# Patient Record
Sex: FEMALE | Race: WHITE | Employment: OTHER | URBAN - NONMETROPOLITAN AREA
[De-identification: names, ages, dates, MRNs, and addresses within clinical notes are randomized per-mention and may not be internally consistent; named-entity substitution may affect disease eponyms.]

---

## 2017-01-25 ENCOUNTER — OFFICE VISIT (OUTPATIENT)
Dept: NEPHROLOGY | Age: 67
End: 2017-01-25

## 2017-01-25 VITALS
DIASTOLIC BLOOD PRESSURE: 80 MMHG | HEART RATE: 60 BPM | RESPIRATION RATE: 18 BRPM | SYSTOLIC BLOOD PRESSURE: 142 MMHG | WEIGHT: 133.4 LBS | BODY MASS INDEX: 20.89 KG/M2

## 2017-01-25 DIAGNOSIS — N18.30 CKD (CHRONIC KIDNEY DISEASE) STAGE 3, GFR 30-59 ML/MIN (HCC): Primary | ICD-10-CM

## 2017-01-25 DIAGNOSIS — I10 ESSENTIAL HYPERTENSION: ICD-10-CM

## 2017-01-25 PROCEDURE — 1036F TOBACCO NON-USER: CPT | Performed by: INTERNAL MEDICINE

## 2017-01-25 PROCEDURE — 1123F ACP DISCUSS/DSCN MKR DOCD: CPT | Performed by: INTERNAL MEDICINE

## 2017-01-25 PROCEDURE — G8400 PT W/DXA NO RESULTS DOC: HCPCS | Performed by: INTERNAL MEDICINE

## 2017-01-25 PROCEDURE — 3017F COLORECTAL CA SCREEN DOC REV: CPT | Performed by: INTERNAL MEDICINE

## 2017-01-25 PROCEDURE — G8419 CALC BMI OUT NRM PARAM NOF/U: HCPCS | Performed by: INTERNAL MEDICINE

## 2017-01-25 PROCEDURE — 3014F SCREEN MAMMO DOC REV: CPT | Performed by: INTERNAL MEDICINE

## 2017-01-25 PROCEDURE — G8484 FLU IMMUNIZE NO ADMIN: HCPCS | Performed by: INTERNAL MEDICINE

## 2017-01-25 PROCEDURE — 1090F PRES/ABSN URINE INCON ASSESS: CPT | Performed by: INTERNAL MEDICINE

## 2017-01-25 PROCEDURE — 4040F PNEUMOC VAC/ADMIN/RCVD: CPT | Performed by: INTERNAL MEDICINE

## 2017-01-25 PROCEDURE — 99204 OFFICE O/P NEW MOD 45 MIN: CPT | Performed by: INTERNAL MEDICINE

## 2017-01-25 PROCEDURE — G8427 DOCREV CUR MEDS BY ELIG CLIN: HCPCS | Performed by: INTERNAL MEDICINE

## 2017-01-25 RX ORDER — ATORVASTATIN CALCIUM 20 MG/1
20 TABLET, FILM COATED ORAL DAILY
COMMUNITY

## 2017-02-22 ENCOUNTER — OFFICE VISIT (OUTPATIENT)
Dept: NEPHROLOGY | Age: 67
End: 2017-02-22

## 2017-02-22 VITALS — WEIGHT: 133 LBS | DIASTOLIC BLOOD PRESSURE: 80 MMHG | SYSTOLIC BLOOD PRESSURE: 124 MMHG | BODY MASS INDEX: 20.83 KG/M2

## 2017-02-22 DIAGNOSIS — N18.2 CKD (CHRONIC KIDNEY DISEASE) STAGE 2, GFR 60-89 ML/MIN: Primary | ICD-10-CM

## 2017-02-22 DIAGNOSIS — I10 ESSENTIAL HYPERTENSION: ICD-10-CM

## 2017-02-22 PROCEDURE — 3014F SCREEN MAMMO DOC REV: CPT | Performed by: INTERNAL MEDICINE

## 2017-02-22 PROCEDURE — G8419 CALC BMI OUT NRM PARAM NOF/U: HCPCS | Performed by: INTERNAL MEDICINE

## 2017-02-22 PROCEDURE — 4040F PNEUMOC VAC/ADMIN/RCVD: CPT | Performed by: INTERNAL MEDICINE

## 2017-02-22 PROCEDURE — 99214 OFFICE O/P EST MOD 30 MIN: CPT | Performed by: INTERNAL MEDICINE

## 2017-02-22 PROCEDURE — G8484 FLU IMMUNIZE NO ADMIN: HCPCS | Performed by: INTERNAL MEDICINE

## 2017-02-22 PROCEDURE — 1123F ACP DISCUSS/DSCN MKR DOCD: CPT | Performed by: INTERNAL MEDICINE

## 2017-02-22 PROCEDURE — 1036F TOBACCO NON-USER: CPT | Performed by: INTERNAL MEDICINE

## 2017-02-22 PROCEDURE — G8427 DOCREV CUR MEDS BY ELIG CLIN: HCPCS | Performed by: INTERNAL MEDICINE

## 2017-02-22 PROCEDURE — G8400 PT W/DXA NO RESULTS DOC: HCPCS | Performed by: INTERNAL MEDICINE

## 2017-02-22 PROCEDURE — 1090F PRES/ABSN URINE INCON ASSESS: CPT | Performed by: INTERNAL MEDICINE

## 2017-02-22 PROCEDURE — 3017F COLORECTAL CA SCREEN DOC REV: CPT | Performed by: INTERNAL MEDICINE

## 2017-08-23 ENCOUNTER — HOSPITAL ENCOUNTER (OUTPATIENT)
Dept: ULTRASOUND IMAGING | Age: 67
Discharge: HOME | End: 2017-08-23
Payer: MEDICARE

## 2017-08-23 DIAGNOSIS — N93.9 VAGINAL BLEEDING: ICD-10-CM

## 2017-08-23 PROCEDURE — 76830 TRANSVAGINAL US NON-OB: CPT

## 2017-10-09 LAB
BASOPHILS ABSOLUTE: NORMAL /ΜL
BASOPHILS RELATIVE PERCENT: NORMAL %
BUN BLDV-MCNC: 17 MG/DL
CALCIUM SERPL-MCNC: 9.2 MG/DL
CHLORIDE BLD-SCNC: 105 MMOL/L
CO2: 26 MMOL/L
CREAT SERPL-MCNC: 1 MG/DL
EOSINOPHILS ABSOLUTE: NORMAL /ΜL
EOSINOPHILS RELATIVE PERCENT: NORMAL %
GFR CALCULATED: 55
GLUCOSE BLD-MCNC: 82 MG/DL
HCT VFR BLD CALC: 42.9 % (ref 36–46)
HEMOGLOBIN: 14.1 G/DL (ref 12–16)
LYMPHOCYTES ABSOLUTE: NORMAL /ΜL
LYMPHOCYTES RELATIVE PERCENT: NORMAL %
MCH RBC QN AUTO: NORMAL PG
MCHC RBC AUTO-ENTMCNC: NORMAL G/DL
MCV RBC AUTO: NORMAL FL
MONOCYTES ABSOLUTE: NORMAL /ΜL
MONOCYTES RELATIVE PERCENT: NORMAL %
NEUTROPHILS ABSOLUTE: NORMAL /ΜL
NEUTROPHILS RELATIVE PERCENT: NORMAL %
PLATELET # BLD: 170 K/ΜL
PMV BLD AUTO: NORMAL FL
POTASSIUM SERPL-SCNC: 3.7 MMOL/L
RBC # BLD: 4.55 10^6/ΜL
SODIUM BLD-SCNC: 170 MMOL/L
WBC # BLD: 6.5 10^3/ML

## 2017-12-13 ENCOUNTER — HOSPITAL ENCOUNTER (OUTPATIENT)
Dept: ULTRASOUND IMAGING | Age: 67
Discharge: HOME | End: 2017-12-13
Payer: MEDICARE

## 2017-12-13 ENCOUNTER — HOSPITAL ENCOUNTER (OUTPATIENT)
Dept: GENERAL RADIOLOGY | Age: 67
Discharge: HOME | End: 2017-12-13
Payer: MEDICARE

## 2017-12-13 DIAGNOSIS — I71.40 ABDOMINAL AORTIC ANEURYSM (AAA) WITHOUT RUPTURE: ICD-10-CM

## 2017-12-13 PROCEDURE — 76775 US EXAM ABDO BACK WALL LIM: CPT

## 2017-12-13 PROCEDURE — 93226 XTRNL ECG REC<48 HR SCAN A/R: CPT

## 2017-12-13 PROCEDURE — 93225 XTRNL ECG REC<48 HRS REC: CPT

## 2017-12-19 ENCOUNTER — HOSPITAL ENCOUNTER (OUTPATIENT)
Dept: NON INVASIVE DIAGNOSTICS | Age: 67
Discharge: HOME | End: 2017-12-19
Payer: MEDICARE

## 2017-12-19 LAB
LV EF: 55 %
LVEF MODALITY: NORMAL

## 2017-12-19 PROCEDURE — 93306 TTE W/DOPPLER COMPLETE: CPT

## 2018-02-14 ENCOUNTER — HOSPITAL ENCOUNTER (OUTPATIENT)
Age: 68
Discharge: HOME | End: 2018-02-14
Payer: MEDICARE

## 2018-02-14 DIAGNOSIS — N18.2 CKD (CHRONIC KIDNEY DISEASE) STAGE 2, GFR 60-89 ML/MIN: ICD-10-CM

## 2018-02-14 LAB
ANION GAP SERPL CALCULATED.3IONS-SCNC: 15 MEQ/L (ref 8–16)
BUN BLDV-MCNC: 20 MG/DL (ref 7–22)
CALCIUM SERPL-MCNC: 9.5 MG/DL (ref 8.5–10.5)
CHLORIDE BLD-SCNC: 96 MEQ/L (ref 98–111)
CO2: 28 MEQ/L (ref 23–33)
CREAT SERPL-MCNC: 0.9 MG/DL (ref 0.4–1.2)
GFR SERPL CREATININE-BSD FRML MDRD: 62 ML/MIN/1.73M2
GLUCOSE BLD-MCNC: 92 MG/DL (ref 70–108)
POTASSIUM SERPL-SCNC: 3.7 MEQ/L (ref 3.5–5.2)
SODIUM BLD-SCNC: 139 MEQ/L (ref 135–145)

## 2018-02-14 PROCEDURE — 80048 BASIC METABOLIC PNL TOTAL CA: CPT

## 2018-02-14 PROCEDURE — 36415 COLL VENOUS BLD VENIPUNCTURE: CPT

## 2018-02-21 ENCOUNTER — OFFICE VISIT (OUTPATIENT)
Dept: NEPHROLOGY | Age: 68
End: 2018-02-21
Payer: MEDICARE

## 2018-02-21 VITALS
SYSTOLIC BLOOD PRESSURE: 112 MMHG | RESPIRATION RATE: 16 BRPM | BODY MASS INDEX: 20.83 KG/M2 | WEIGHT: 133 LBS | HEART RATE: 68 BPM | DIASTOLIC BLOOD PRESSURE: 80 MMHG

## 2018-02-21 DIAGNOSIS — N18.2 CKD (CHRONIC KIDNEY DISEASE), STAGE II: Primary | ICD-10-CM

## 2018-02-21 DIAGNOSIS — I10 ESSENTIAL HYPERTENSION: ICD-10-CM

## 2018-02-21 PROCEDURE — 1090F PRES/ABSN URINE INCON ASSESS: CPT | Performed by: INTERNAL MEDICINE

## 2018-02-21 PROCEDURE — G8484 FLU IMMUNIZE NO ADMIN: HCPCS | Performed by: INTERNAL MEDICINE

## 2018-02-21 PROCEDURE — 3014F SCREEN MAMMO DOC REV: CPT | Performed by: INTERNAL MEDICINE

## 2018-02-21 PROCEDURE — G8427 DOCREV CUR MEDS BY ELIG CLIN: HCPCS | Performed by: INTERNAL MEDICINE

## 2018-02-21 PROCEDURE — 3017F COLORECTAL CA SCREEN DOC REV: CPT | Performed by: INTERNAL MEDICINE

## 2018-02-21 PROCEDURE — 1036F TOBACCO NON-USER: CPT | Performed by: INTERNAL MEDICINE

## 2018-02-21 PROCEDURE — G8420 CALC BMI NORM PARAMETERS: HCPCS | Performed by: INTERNAL MEDICINE

## 2018-02-21 PROCEDURE — 1123F ACP DISCUSS/DSCN MKR DOCD: CPT | Performed by: INTERNAL MEDICINE

## 2018-02-21 PROCEDURE — 99213 OFFICE O/P EST LOW 20 MIN: CPT | Performed by: INTERNAL MEDICINE

## 2018-02-21 PROCEDURE — 4040F PNEUMOC VAC/ADMIN/RCVD: CPT | Performed by: INTERNAL MEDICINE

## 2018-02-21 PROCEDURE — G8400 PT W/DXA NO RESULTS DOC: HCPCS | Performed by: INTERNAL MEDICINE

## 2018-02-21 RX ORDER — CHLORTHALIDONE 25 MG/1
25 TABLET ORAL DAILY
COMMUNITY

## 2018-02-21 RX ORDER — LISINOPRIL 10 MG/1
5 TABLET ORAL 2 TIMES DAILY
COMMUNITY
End: 2019-12-16

## 2018-02-21 RX ORDER — TRAZODONE HYDROCHLORIDE 50 MG/1
50 TABLET ORAL NIGHTLY
COMMUNITY
End: 2019-12-16

## 2019-01-02 NOTE — PROCEDURES
[address] [place] [address]                                  HOLTER MONITOR    PATIENT NAME: Mariya Rogel                       :        1950  MED REC NO:   [id number]                           ROOM:  ACCOUNT NO:   [de-identified]                           ADMIT DATE: 2017  PROVIDER:     Dorene Watson. Claudean Press, M.D. Rosaura Gutter 24-HOURS    DATE OF STUDY:  2017    DURATION:  24 hours. QUALITY:  Reasonable. INDICATION:  Palpitation. DIARY:  Diary presented and no entry noted. FINDING:  The patient is in normal sinus rhythm. Average heart rate is 83  beats per minute, ranging from 55 to 131 beats per minute. Maximum R to R  interval is 1.5 seconds at 5:18 a.m. There are 159 ventricular ectopic  beats, of which 145 isolated, 1 couplet, 3 supraventricular ectopic runs. The longest composed of 5 beats, rate 133 beats per minute. The fastest  composed of 3 beats, rate 170 beats per minute. There is frequent ventricular ectopic beat, total of 8449, ventricular  ectopic beat has been noted accounting for 7% of the cardiac cycle, of  which 8207 isolated, 121 couplet, 406 bigeminy pattern. No ventricular  tachycardia noted. CONCLUSION:  This is a normal sinus rhythm with average heart rate of 83  beats per minute ranging from 55 to 131 beats per minute. No significant  pause of more than 1.5 seconds noted. Rare supraventricular ectopic beats,  isolated couplet, and supraventricular ectopic run. Frequent ventricular  ectopic beats, a total of 8449 accounting for 7% of the cardiac cycle,  predominantly isolated and few couplets and few bigeminy pattern. No  evidence of nonsustained ventricular tachycardia or no evidence of atrial  fibrillation.   Diary was not presented and so Holter monitor symptom  correlation not possible; however, this frequent ventricular ectopic beat  may cause this patient to have Spoke with pt & states was upset as her a1c was 8.5 which had gone up from 7.2-pt will fu with DR. Lao -denies any  suicidal thoughts-stating she has not been taking any rx for depression for over 2 years-pt to report back if any problem,will sched appt with DR. Lao-

## 2019-03-20 ENCOUNTER — OFFICE VISIT (OUTPATIENT)
Dept: RHEUMATOLOGY | Age: 69
End: 2019-03-20
Payer: MEDICARE

## 2019-03-20 ENCOUNTER — NURSE ONLY (OUTPATIENT)
Dept: LAB | Age: 69
End: 2019-03-20

## 2019-03-20 VITALS
HEIGHT: 67 IN | WEIGHT: 136 LBS | BODY MASS INDEX: 21.35 KG/M2 | SYSTOLIC BLOOD PRESSURE: 149 MMHG | DIASTOLIC BLOOD PRESSURE: 92 MMHG | HEART RATE: 72 BPM

## 2019-03-20 DIAGNOSIS — M15.9 PRIMARY OSTEOARTHRITIS INVOLVING MULTIPLE JOINTS: ICD-10-CM

## 2019-03-20 DIAGNOSIS — M25.50 POLYARTHRALGIA: Primary | ICD-10-CM

## 2019-03-20 DIAGNOSIS — M25.50 POLYARTHRALGIA: ICD-10-CM

## 2019-03-20 LAB
ALBUMIN SERPL-MCNC: 4.1 G/DL (ref 3.5–5.1)
ALP BLD-CCNC: 63 U/L (ref 38–126)
ALT SERPL-CCNC: 16 U/L (ref 11–66)
ANION GAP SERPL CALCULATED.3IONS-SCNC: 11 MEQ/L (ref 8–16)
AST SERPL-CCNC: 20 U/L (ref 5–40)
BASOPHILS # BLD: 0.4 %
BASOPHILS ABSOLUTE: 0 THOU/MM3 (ref 0–0.1)
BILIRUB SERPL-MCNC: 0.7 MG/DL (ref 0.3–1.2)
BUN BLDV-MCNC: 17 MG/DL (ref 7–22)
C-REACTIVE PROTEIN: 0.12 MG/DL (ref 0–1)
CALCIUM SERPL-MCNC: 9.7 MG/DL (ref 8.5–10.5)
CHLORIDE BLD-SCNC: 100 MEQ/L (ref 98–111)
CO2: 29 MEQ/L (ref 23–33)
CREAT SERPL-MCNC: 1 MG/DL (ref 0.4–1.2)
EOSINOPHIL # BLD: 1.6 %
EOSINOPHILS ABSOLUTE: 0.1 THOU/MM3 (ref 0–0.4)
ERYTHROCYTE [DISTWIDTH] IN BLOOD BY AUTOMATED COUNT: 13.8 % (ref 11.5–14.5)
ERYTHROCYTE [DISTWIDTH] IN BLOOD BY AUTOMATED COUNT: 47.6 FL (ref 35–45)
GFR SERPL CREATININE-BSD FRML MDRD: 55 ML/MIN/1.73M2
GLUCOSE BLD-MCNC: 106 MG/DL (ref 70–108)
HCT VFR BLD CALC: 43.8 % (ref 37–47)
HEMOGLOBIN: 14.2 GM/DL (ref 12–16)
IMMATURE GRANS (ABS): 0.01 THOU/MM3 (ref 0–0.07)
IMMATURE GRANULOCYTES: 0.1 %
LYMPHOCYTES # BLD: 36.1 %
LYMPHOCYTES ABSOLUTE: 2.7 THOU/MM3 (ref 1–4.8)
MCH RBC QN AUTO: 30.6 PG (ref 26–33)
MCHC RBC AUTO-ENTMCNC: 32.4 GM/DL (ref 32.2–35.5)
MCV RBC AUTO: 94.4 FL (ref 81–99)
MONOCYTES # BLD: 6.5 %
MONOCYTES ABSOLUTE: 0.5 THOU/MM3 (ref 0.4–1.3)
NUCLEATED RED BLOOD CELLS: 0 /100 WBC
PHOSPHORUS: 3.5 MG/DL (ref 2.4–4.7)
PLATELET # BLD: 199 THOU/MM3 (ref 130–400)
PMV BLD AUTO: 11.9 FL (ref 9.4–12.4)
POTASSIUM SERPL-SCNC: 4 MEQ/L (ref 3.5–5.2)
RBC # BLD: 4.64 MILL/MM3 (ref 4.2–5.4)
RHEUMATOID FACTOR: < 10 IU/ML (ref 0–13)
SEDIMENTATION RATE, ERYTHROCYTE: 8 MM/HR (ref 0–20)
SEG NEUTROPHILS: 55.3 %
SEGMENTED NEUTROPHILS ABSOLUTE COUNT: 4.1 THOU/MM3 (ref 1.8–7.7)
SODIUM BLD-SCNC: 140 MEQ/L (ref 135–145)
TOTAL PROTEIN: 7.1 G/DL (ref 6.1–8)
URIC ACID: 5 MG/DL (ref 2.4–5.7)
WBC # BLD: 7.4 THOU/MM3 (ref 4.8–10.8)

## 2019-03-20 PROCEDURE — G8427 DOCREV CUR MEDS BY ELIG CLIN: HCPCS | Performed by: INTERNAL MEDICINE

## 2019-03-20 PROCEDURE — G8420 CALC BMI NORM PARAMETERS: HCPCS | Performed by: INTERNAL MEDICINE

## 2019-03-20 PROCEDURE — 99204 OFFICE O/P NEW MOD 45 MIN: CPT | Performed by: INTERNAL MEDICINE

## 2019-03-20 PROCEDURE — 3017F COLORECTAL CA SCREEN DOC REV: CPT | Performed by: INTERNAL MEDICINE

## 2019-03-20 PROCEDURE — 1090F PRES/ABSN URINE INCON ASSESS: CPT | Performed by: INTERNAL MEDICINE

## 2019-03-20 PROCEDURE — G8484 FLU IMMUNIZE NO ADMIN: HCPCS | Performed by: INTERNAL MEDICINE

## 2019-03-20 PROCEDURE — 1101F PT FALLS ASSESS-DOCD LE1/YR: CPT | Performed by: INTERNAL MEDICINE

## 2019-03-20 RX ORDER — ALENDRONATE SODIUM 70 MG/1
TABLET ORAL
Refills: 1 | COMMUNITY
Start: 2019-03-04

## 2019-03-20 ASSESSMENT — ENCOUNTER SYMPTOMS
WHEEZING: 0
SHORTNESS OF BREATH: 0
DIARRHEA: 0
EYE REDNESS: 0
RESPIRATORY NEGATIVE: 1
EYE PAIN: 0
CONSTIPATION: 0
BLOOD IN STOOL: 0
EYES NEGATIVE: 1
NAUSEA: 0
VOMITING: 0

## 2019-03-20 ASSESSMENT — JOINT PAIN
TOTAL NUMBER OF TENDER JOINTS: 7
TOTAL NUMBER OF SWOLLEN JOINTS: 9

## 2019-03-21 ENCOUNTER — TELEPHONE (OUTPATIENT)
Dept: RHEUMATOLOGY | Age: 69
End: 2019-03-21

## 2019-03-22 LAB
ANA SCREEN: NORMAL
CYCLIC CITRULLIN PEPTIDE AB: 3 UNITS (ref 0–19)

## 2019-06-20 ENCOUNTER — OFFICE VISIT (OUTPATIENT)
Dept: RHEUMATOLOGY | Age: 69
End: 2019-06-20
Payer: MEDICARE

## 2019-06-20 VITALS
WEIGHT: 137 LBS | OXYGEN SATURATION: 99 % | BODY MASS INDEX: 21.5 KG/M2 | DIASTOLIC BLOOD PRESSURE: 80 MMHG | HEIGHT: 67 IN | SYSTOLIC BLOOD PRESSURE: 120 MMHG | HEART RATE: 68 BPM

## 2019-06-20 DIAGNOSIS — M15.9 PRIMARY OSTEOARTHRITIS INVOLVING MULTIPLE JOINTS: Primary | ICD-10-CM

## 2019-06-20 DIAGNOSIS — Z51.81 MEDICATION MONITORING ENCOUNTER: ICD-10-CM

## 2019-06-20 DIAGNOSIS — M19.90 INFLAMMATORY ARTHRITIS: ICD-10-CM

## 2019-06-20 PROCEDURE — 4040F PNEUMOC VAC/ADMIN/RCVD: CPT | Performed by: INTERNAL MEDICINE

## 2019-06-20 PROCEDURE — G8420 CALC BMI NORM PARAMETERS: HCPCS | Performed by: INTERNAL MEDICINE

## 2019-06-20 PROCEDURE — 1036F TOBACCO NON-USER: CPT | Performed by: INTERNAL MEDICINE

## 2019-06-20 PROCEDURE — 99214 OFFICE O/P EST MOD 30 MIN: CPT | Performed by: INTERNAL MEDICINE

## 2019-06-20 PROCEDURE — 1090F PRES/ABSN URINE INCON ASSESS: CPT | Performed by: INTERNAL MEDICINE

## 2019-06-20 PROCEDURE — G8400 PT W/DXA NO RESULTS DOC: HCPCS | Performed by: INTERNAL MEDICINE

## 2019-06-20 PROCEDURE — G8427 DOCREV CUR MEDS BY ELIG CLIN: HCPCS | Performed by: INTERNAL MEDICINE

## 2019-06-20 PROCEDURE — 3017F COLORECTAL CA SCREEN DOC REV: CPT | Performed by: INTERNAL MEDICINE

## 2019-06-20 PROCEDURE — 1123F ACP DISCUSS/DSCN MKR DOCD: CPT | Performed by: INTERNAL MEDICINE

## 2019-06-20 RX ORDER — PREDNISONE 10 MG/1
TABLET ORAL
Qty: 15 TABLET | Refills: 0 | Status: SHIPPED | OUTPATIENT
Start: 2019-06-20 | End: 2019-06-30

## 2019-06-20 RX ORDER — HYDROXYCHLOROQUINE SULFATE 200 MG/1
300 TABLET, FILM COATED ORAL DAILY
Qty: 60 TABLET | Refills: 3 | Status: SHIPPED | OUTPATIENT
Start: 2019-06-20 | End: 2019-11-27 | Stop reason: SDUPTHER

## 2019-06-20 ASSESSMENT — ENCOUNTER SYMPTOMS
NAUSEA: 0
VOMITING: 0
SHORTNESS OF BREATH: 0
CONSTIPATION: 0
WHEEZING: 0
EYE PAIN: 0
BLOOD IN STOOL: 0
EYE REDNESS: 0
DIARRHEA: 0
EYES NEGATIVE: 1
RESPIRATORY NEGATIVE: 1

## 2019-06-20 ASSESSMENT — JOINT PAIN
TOTAL NUMBER OF TENDER JOINTS: 1
TOTAL NUMBER OF SWOLLEN JOINTS: 12

## 2019-06-20 NOTE — PROGRESS NOTES
[id number] [facility] Follow Up       Date Of Service: 6/20/2019  Provider: Shania Regan DO    Name: Gladis Barnett   MRN: [id number]    Chief Complaint(s)      Chief Complaint   Patient presents with    3 Month Follow-Up      The patient reports joint pain in Bilateral hand, right wrist, /cmc joint, Left shoulder, bilateral knees, neck and lower back. Most severe in back. Symptoms started with back pain years ago with associated Right sided radicular sx's and paresthesia. Treated with surgery in 2004 with relif of the joint pains. The joints of the hands, bilateral knees and left shoulder started around 2018 with some swelling along the base of the thumb. Noted deformites of the fingers for the psat year. MGM with similar arthritic hands changes. DENIES redness/warmth of the joints. Pain is up to 8.5/10, localized daily pain in the back and hands. Timing: n/a. Denies AM stiffness. Aggravating: back-- prolonged standing. Left shoulder: occasionally with rotation of head to right. Alleviating: glucosamine/chondrointon. Tylenol. Prior tx: ibuprofen (off b/c CKD). History of Present illness (HPI)    Gladis Barnett   is a(n)68 y.o. female with a hx of CKD stage 3, Hypertension, hyperlipidemia, chronic low back pain, osteoarthritis hand here for the f/u evaluation o fhe of osteoarthritis/ polyartharlgia     Joint pain in the finger, Right , bilateral knee, neck and back. Localized, intermittent, mild pain 3/10. Aggravating: back-- prolonged standing. . Alleviating: glucosamine/chondrointon. Tylenol. Denies joint swelling. No AM stiffness. + headaches intermittent. + dry eyes localized, intermittent. Review of Systems    Review of Systems   Constitutional: Negative for fever. HENT: Negative for congestion, hearing loss and nosebleeds. Eyes: Negative. Negative for pain and redness. Respiratory: Negative. Negative for shortness of breath and wheezing. Cardiovascular: Negative. Negative for chest pain and palpitations. Gastrointestinal: Negative for blood in stool, constipation, diarrhea, nausea and vomiting. Genitourinary: Negative for difficulty urinating, hematuria, vaginal discharge and vaginal pain. Musculoskeletal: Negative for myalgias. Skin: Negative for rash. Neurological: Negative for dizziness, weakness and headaches. Psychiatric/Behavioral: The patient is not nervous/anxious.             PAST MEDICAL HISTORY      Past Medical History:   Diagnosis Date    HTN (hypertension)     Hyperlipidemia     Hypertension     Renal insufficiency        PAST SURGICAL HISTORY      Past Surgical History:   Procedure Laterality Date    BACK SURGERY  2004       FAMILY HISTORY      Family History   Problem Relation Age of Onset    Other Father         heart disorder, brain aneurysm    High Blood Pressure Father     Heart Attack Maternal Grandfather     Arthritis Mother     Breast Cancer Sister     High Blood Pressure Daughter     High Cholesterol Daughter     High Blood Pressure Daughter     High Cholesterol Daughter     High Blood Pressure Son        SOCIAL HISTORY      Social History     Tobacco History     Smoking Status  Never Smoker    Smokeless Tobacco Use  Never Used          Alcohol History     Alcohol Use Status  No          Drug Use     Drug Use Status  No          Sexual Activity     Sexually Active  Not Asked                ALLERGIES   No Known Allergies    CURRENT MEDICATIONS      Current Outpatient Medications   Medication Sig Dispense Refill    alendronate (FOSAMAX) 70 MG tablet take 1 tablet by mouth every week  1    metoprolol tartrate (LOPRESSOR) 25 MG tablet Take 12.5 mg by mouth 2 times daily      traZODone (DESYREL) 50 MG tablet Take 50 mg by mouth nightly      chlorthalidone (HYGROTON) 25 MG tablet Take 25 mg by mouth daily      Misc Natural Products (OSTEO BI-FLEX JOINT SHIELD PO) Take by mouth daily      lisinopril (PRINIVIL;ZESTRIL) 10 MG tablet Take 5 mg by mouth 2 times daily      acetaminophen (TYLENOL) 100 MG/ML solution Take 10 mg/kg by mouth every 4 hours as needed for Fever      Lactobacillus (ACIDOPHILUS PO) Take by mouth daily      atorvastatin (LIPITOR) 20 MG tablet Take 20 mg by mouth daily      Multiple Vitamin (MULTI-VITAMIN PO) Take  by mouth daily.  Multiple Vitamins-Minerals (VISION FORMULA PO) Take  by mouth daily.  CALCIUM CITRATE by Does not apply route 2 times daily        No current facility-administered medications for this visit. PHYSICAL EXAMINATION / OBJECTIVE     Objective:  /80 (Site: Left Upper Arm, Position: Sitting, Cuff Size: Medium Adult)   Pulse 68   Ht 5' 7.01\" (1.702 m)   Wt 137 lb (62.1 kg)   SpO2 99%   BMI 21.45 kg/m²     Physical Exam   Constitutional: She is oriented to person, place, and time. She appears well-developed and well-nourished. No distress. HENT:   Head: Normocephalic and atraumatic. Eyes: Pupils are equal, round, and reactive to light. Conjunctivae and EOM are normal.   Neck: Normal range of motion. Neck supple. Cardiovascular: Normal rate, regular rhythm and normal heart sounds. Pulmonary/Chest: Effort normal and breath sounds normal. No respiratory distress. She has no wheezes. She has no rales. Lymphadenopathy:     She has no cervical adenopathy. Neurological: She is alert and oriented to person, place, and time. Skin: Skin is warm and dry. She is not diaphoretic. Psychiatric: She has a normal mood and affect. Her behavior is normal.         Extremities No cyanosis, clubbing, or edema. Pulses 4+ and equal bilaterally (radial, dorsal pedis, posterior tibial)    Msk:  Upper extremities: ROM --- intact bilateral upper extremities. Msk Strength 5/5 in bilateral shoulders, elbow flex/ext. Hands: +b/l heberden nodes,  - Hyperextension at PIP joints bilateral hands. CMC squaring bilat.    - TJC and SJC as below    Lower extremities:- ROM , strength intact bilateral- intact bilateral lower extremities. Tender ,  Synovitis -         Back/spine   No kyphosis, scoliosis, Right sacral sulci tenderness. Tender and hypertonic upper traps.                 Physical Exam     Tenderness:   Left hand: 4th DIP  LLE: tibiofemoral    Swelling:   Right hand: 1st MCP, 2nd MCP, 3rd MCP, 2nd PIP, 3rd PIP and 4th PIP  Left hand: 1st MCP, 2nd MCP, 3rd MCP, 3rd PIP, 4th PIP, 5th PIP and 4th DIP    LANDON-28 tender joint count: 1  LANDON-28 swollen joint count: 12      RAPID3 Composite Score  6/20/2019 --- RAPID 3: 0 + 3 + 0 = 3       Remission: <3  Low Disease Activity: <6  Moderate Disease Activity: >=6 and <=12  High Disease Activity: >12    LABS        CBC  Lab Results   Component Value Date    WBC 7.4 03/20/2019    RBC 4.64 03/20/2019    HGB 14.2 03/20/2019    HCT 43.8 03/20/2019    MCV 94.4 03/20/2019    MCH 30.6 03/20/2019    MCHC 32.4 03/20/2019     03/20/2019       CMP  Lab Results   Component Value Date    CALCIUM 9.7 03/20/2019    LABALBU 4.1 03/20/2019    PROT 7.1 03/20/2019     03/20/2019    K 4.0 03/20/2019    CO2 29 03/20/2019     03/20/2019    BUN 17 03/20/2019    CREATININE 1.0 03/20/2019    ALKPHOS 63 03/20/2019    ALT 16 03/20/2019    AST 20 03/20/2019       HgBA1c: No components found for: HGBA1C    No results found for: TSHFT4, TSH  Lab Results   Component Value Date    VITD25 47 02/13/2017         Lab Results   Component Value Date    ANASCRN None Detected 03/20/2019     No results found for: SSA  No results found for: SSB  No results found for: ANTI-SMITH  No results found for: DSDNAAB   No results found for: ANTIRNP  No results found for: C3, C4  Lab Results   Component Value Date    CCPAB 3 03/20/2019     Lab Results   Component Value Date    RF < 10 03/20/2019       No components found for: CANCASCRN, APANCASCRN  Lab Results   Component Value Date    SEDRATE 8 03/20/2019     Lab Results   Component Value Date    CRP 0.12 03/20/2019 RADIOLOGY:         ASSESSMENT/PLAN    Assessment   Plan   1. Inflammatory arthritis. - hands, Left posterior shoulder, knees x 1 year, back pain x several year. Denies joint redness/warmth + swelling along the [facility] joint. XR hands with subluxation of 2nd MCP noted. Joint exam with tender MCPs, PIPs, DIPs and CMC joint bilateral elbows. Swelling at 2-3 MCP bilat, PIPs mild fullness, + heberden nodes bilat. - plan to start plaquenil 300mg daily if relief with prednisone    - prednisone challenge. - evaluation for secondary causes of osteoarthritis/joint pains including     2. Primary osteoarthritis involving multiple joints  - evaluation of secondary causes. --- crystalline, inflammatory , vs other. ? Inflammatory osteoarthritis    - continue tylenol   - discussed topical volteran gel, cymbalta, etc.      3. Cervicalgia   - + cervical dysfunction with right on right cervical dysfunction. + muscel hypertonicity     4. Chronic low back pain   - hx of back pain prior surgery in 2004, intemrittent low back pain mainly with prolonged standing. No radicular sx's, paresthesias or red flag sx's   - some degenerative arthritic changes noted in the lumbar spine upon review of the prior CT A/P. imaing was reviewed with the patient. .    - avoiding oral/systemic NSAIDs b/c of CKD   - continue tylenol   - may benefit from trial of cymbalta. Medication monitoring  - Plaquenil can cause skin rash, GI issues, visual blurriness and increases the risk of retinal toxicity; yearly eye/retinal exam was advised while taking plaquenil. No follow-ups on file. Electronically signed by Moe Segovia DO on 6/20/2019 at 9:55 AM    New Prescriptions    No medications on file       Thank you for allowing me to participate in the care of this patient. Please call if there are any questions.

## 2019-06-20 NOTE — PATIENT INSTRUCTIONS
Patient Education        hydroxychloroquine  Pronunciation:  paolo austin ee KLOR oh kwin  Brand:  Plaquenil  What is the most important information I should know about hydroxychloroquine? Taking hydroxychloroquine long-term or at high doses may cause irreversible damage to the retina of your eye. Stop taking hydroxychloroquine and call your doctor at once if you have trouble focusing, if you see light streaks or flashes in your vision, or if you notice any swelling or color changes in your eyes. What is hydroxychloroquine? Hydroxychloroquine is used to treat or prevent malaria, a disease caused by parasites that enter the body through the bite of a mosquito. Malaria is common in areas such as [place], [place], and [place]. This medicine is not effective against all strains of malaria. Hydroxychloroquine is also used to treat symptoms of rheumatoid arthritis and discoid or systemic lupus erythematosus. Hydroxychloroquine may also be used for purposes not listed in this medication guide. What should I discuss with my health care provider before taking hydroxychloroquine? You should not use hydroxychloroquine if you are allergic to it. Hydroxychloroquine should not be used for long-term treatment in children. To make sure hydroxychloroquine is safe for you, tell your doctor if you have:  · a history of vision changes or damage to your retina caused by an anti-malaria medication;  · heart disease, heart rhythm disorder (such as long QT syndrome);  · diabetes;  · a stomach disorder;  · an allergy to quinine;  · liver or kidney disease;  · psoriasis;  · alcoholism; or  · a genetic enzyme disorder such as porphyria or glucose-6-phosphate dehydrogenase (G6PD) deficiency. It is not known whether this medicine will harm an unborn baby. Tell your doctor if you are pregnant or plan to become pregnant. Malaria is more likely to cause death in a pregnant woman.   If you are pregnant, talk with your doctor about the risks of traveling to areas where malaria is common. It is not known whether hydroxychloroquine passes into breast milk or if it could harm a nursing baby. Tell your doctor if you are breast-feeding a baby. Hydroxychloroquine is not approved for use by anyone younger than 25years old. How should I take hydroxychloroquine? Follow all directions on your prescription label. Do not take this medicine in larger or smaller amounts or for longer than recommended. Hydroxychloroquine is sometimes given only once per week. Choose the same day each week to take this medicine if you are on a weekly dosing schedule. Take hydroxychloroquine with a meal or a glass of milk. To prevent malaria: Start taking the medicine 2 weeks before entering an area where malaria is common. Continue taking the medicine regularly during your stay and for at least 8 weeks after you leave the area. To treat malaria: Hydroxychloroquine is usually given for 3 days, starting with one high dose followed by a smaller dose during the next 2 days in a row. Take this medicine for the full prescribed length of time for malaria. Your symptoms may improve before the infection is completely cleared. Use protective clothing, insect repellents, and mosquito netting around your bed to further prevent mosquito bites that could cause malaria. Call your doctor as soon as possible if you have been exposed to malaria, or if you have fever or other symptoms of illness during or after a stay in an area where malaria is common. No medication is 100% effective in treating or preventing all types of malaria. For best results, keep using the medication as directed. Talk with your doctor if you have fever, vomiting, or diarrhea during your treatment. When treating lupus  or arthritis, hydroxychloroquine is usually given daily for several weeks or months. For best results, keep using the medication as directed.  Talk with your doctor if your symptoms do not improve after 6 months of treatment. While using hydroxychloroquine, you may need frequent blood tests and vision exams. Store at room temperature away from moisture, heat, and light. What happens if I miss a dose? Take the missed dose as soon as you remember. Skip the missed dose if it is almost time for your next scheduled dose. Do not take extra medicine to make up the missed dose. What happens if I overdose? Seek emergency medical attention or call the Poison Help line at 1-331.367.7690. An overdose of hydroxychloroquine can be fatal, especially in children. Hydroxychloroquine overdose must be treated quickly. You may be told to induce vomiting right away (at home, before transport to an emergency room). Ask the poison control center how to induce vomiting in the case of an overdose. Overdose symptoms may include drowsiness, vision changes, slow heart rate, chest pain, severe dizziness, seizure (convulsions), or shallow breathing. What should I avoid while taking hydroxychloroquine? Avoid taking an antacid or Kaopectate (kaolin-pectin) within 4 hours before or after you take hydroxychloroquine. Some antacids can make it harder for your body to absorb hydroxychloroquine. What are the possible side effects of hydroxychloroquine? Get emergency medical help if you have signs of an allergic reaction:  hives; difficulty breathing; swelling of your face, lips, tongue, or throat. Taking hydroxychloroquine long-term or at high doses may cause irreversible damage to the retina of your eye. Stop taking hydroxychloroquine and call your doctor at once if you have trouble focusing, if you see light streaks or flashes in your vision, or if you notice any swelling or color changes in your eyes.   Call your doctor at once if you have:  · headache with chest pain and severe dizziness, fainting, fast or pounding heartbeats;  · very slow heart rate, weak pulse;  · muscle weakness, numbness or tingling;  · low blood sugar --headache, hunger, sweating, irritability, dizziness, nausea, fast heart rate, and feeling anxious or shaky; or  · low blood cell counts --fever, chills, sore throat, weakness or ill feeling, swollen gums, mouth sores, skin sores, rapid heart rate, pale skin, easy bruising, unusual bleeding, feeling light-headed. Common side effects may include:  · headache, dizziness, ringing in your ears;  · nausea, vomiting, stomach pain;  · loss of appetite, weight loss;  · mood changes, feeling nervous or irritable;  · skin rash or itching; or  · hair loss. This is not a complete list of side effects and others may occur. Call your doctor for medical advice about side effects. You may report side effects to FDA at 8-223-FDA-8142. What other drugs will affect hydroxychloroquine? Hydroxychloroquine can cause serious liver or heart problems, especially if you use certain medicines at the same time, including:  · other medicines to treat malaria;  · an antibiotic or antifungal medicine;  · antiviral medicine to treat hepatitis or HIV/AIDS;  · antidepressants or antipsychotic medicines;  · birth control pills or hormone replacement therapy;  · cancer medication;  · cholesterol-lowering medication;  · heart or blood pressure medicine;  · pain or arthritis medicines (including aspirin, Tylenol, Advil, and Aleve);  · seizure medication;  · stomach acid reducers; or  · tuberculosis medicine. This list is not complete and many other drugs can interact with hydroxychloroquine. This includes prescription and over-the-counter medicines, vitamins, and herbal products. Not all possible interactions are listed in this medication guide. Tell your doctor about all medicines you use, and those you start or stop using during your treatment with hydroxychloroquine. Give a list of all your medicines to any healthcare provider who treats you. Where can I get more information?   Your pharmacist can provide more information about hydroxychloroquine. Remember, keep this and all other medicines out of the reach of children, never share your medicines with others, and use this medication only for the indication prescribed. Every effort has been made to ensure that the information provided by [address] is accurate, up-to-date, and complete, but no guarantee is made to that effect. Drug information contained herein may be time sensitive. [facility] information has been compiled for use by healthcare practitioners and consumers in the [facility] and therefore [facility] does not warrant that uses outside of the [facility] are appropriate, unless specifically indicated otherwise. [facility]'s drug information does not endorse drugs, diagnose patients or recommend therapy. [facility]'s drug information is an informational resource designed to assist licensed healthcare practitioners in caring for their patients and/or to serve consumers viewing this service as a supplement to, and not a substitute for, the expertise, skill, knowledge and judgment of healthcare practitioners. The absence of a warning for a given drug or drug combination in no way should be construed to indicate that the drug or drug combination is safe, effective or appropriate for any given patient. [facility] does not assume any responsibility for any aspect of healthcare administered with the aid of information [facility] provides. The information contained herein is not intended to cover all possible uses, directions, precautions, warnings, drug interactions, allergic reactions, or adverse effects. If you have questions about the drugs you are taking, check with your doctor, nurse or pharmacist.  Copyright 1522-2387 [address]. Version: 8.02. Revision date: 12/5/2017. Care instructions adapted under license by [facility] ([facility]).  If you have questions about a medical condition or this instruction, always ask your healthcare professional. [address] any warranty or liability for your use of this information.

## 2019-11-27 RX ORDER — HYDROXYCHLOROQUINE SULFATE 200 MG/1
300 TABLET, FILM COATED ORAL DAILY
Qty: 45 TABLET | Refills: 3 | Status: SHIPPED | OUTPATIENT
Start: 2019-11-27 | End: 2020-03-17 | Stop reason: SDUPTHER

## 2019-12-16 ENCOUNTER — OFFICE VISIT (OUTPATIENT)
Dept: RHEUMATOLOGY | Age: 69
End: 2019-12-16
Payer: MEDICARE

## 2019-12-16 ENCOUNTER — TELEPHONE (OUTPATIENT)
Dept: RHEUMATOLOGY | Age: 69
End: 2019-12-16

## 2019-12-16 VITALS
OXYGEN SATURATION: 98 % | DIASTOLIC BLOOD PRESSURE: 80 MMHG | SYSTOLIC BLOOD PRESSURE: 132 MMHG | WEIGHT: 147 LBS | HEART RATE: 66 BPM | BODY MASS INDEX: 23.07 KG/M2 | HEIGHT: 67 IN

## 2019-12-16 DIAGNOSIS — M15.9 PRIMARY OSTEOARTHRITIS INVOLVING MULTIPLE JOINTS: Primary | ICD-10-CM

## 2019-12-16 DIAGNOSIS — M19.90 INFLAMMATORY ARTHRITIS: ICD-10-CM

## 2019-12-16 PROCEDURE — 3017F COLORECTAL CA SCREEN DOC REV: CPT | Performed by: INTERNAL MEDICINE

## 2019-12-16 PROCEDURE — 4040F PNEUMOC VAC/ADMIN/RCVD: CPT | Performed by: INTERNAL MEDICINE

## 2019-12-16 PROCEDURE — G8427 DOCREV CUR MEDS BY ELIG CLIN: HCPCS | Performed by: INTERNAL MEDICINE

## 2019-12-16 PROCEDURE — 99214 OFFICE O/P EST MOD 30 MIN: CPT | Performed by: INTERNAL MEDICINE

## 2019-12-16 PROCEDURE — 1123F ACP DISCUSS/DSCN MKR DOCD: CPT | Performed by: INTERNAL MEDICINE

## 2019-12-16 PROCEDURE — G8420 CALC BMI NORM PARAMETERS: HCPCS | Performed by: INTERNAL MEDICINE

## 2019-12-16 PROCEDURE — 1036F TOBACCO NON-USER: CPT | Performed by: INTERNAL MEDICINE

## 2019-12-16 PROCEDURE — G8484 FLU IMMUNIZE NO ADMIN: HCPCS | Performed by: INTERNAL MEDICINE

## 2019-12-16 PROCEDURE — 1090F PRES/ABSN URINE INCON ASSESS: CPT | Performed by: INTERNAL MEDICINE

## 2019-12-16 PROCEDURE — G8400 PT W/DXA NO RESULTS DOC: HCPCS | Performed by: INTERNAL MEDICINE

## 2019-12-16 ASSESSMENT — ENCOUNTER SYMPTOMS
CONSTIPATION: 0
EYE PAIN: 0
BLOOD IN STOOL: 0
WHEEZING: 0
VOMITING: 0
SHORTNESS OF BREATH: 0
EYE REDNESS: 0
DIARRHEA: 0
EYES NEGATIVE: 1
RESPIRATORY NEGATIVE: 1
NAUSEA: 0

## 2020-02-28 ENCOUNTER — HOSPITAL ENCOUNTER (OUTPATIENT)
Dept: ULTRASOUND IMAGING | Age: 70
Discharge: HOME | End: 2020-02-28
Payer: MEDICARE

## 2020-02-28 PROCEDURE — 76775 US EXAM ABDO BACK WALL LIM: CPT

## 2020-03-16 NOTE — TELEPHONE ENCOUNTER
Vicki Carrasquillo called requesting a refill on the following medications:  Requested Prescriptions     Pending Prescriptions Disp Refills    hydroxychloroquine (PLAQUENIL) 200 MG tablet 45 tablet 3     Sig: Take 1.5 tablets by mouth daily     [facility]  . pv      Date of last visit: 12/16/19  Date of next visit (if applicable): Visit date not found

## 2020-03-17 RX ORDER — HYDROXYCHLOROQUINE SULFATE 200 MG/1
300 TABLET, FILM COATED ORAL DAILY
Qty: 45 TABLET | Refills: 3 | Status: SHIPPED | OUTPATIENT
Start: 2020-03-17 | End: 2020-07-14 | Stop reason: SDUPTHER

## 2020-04-20 ENCOUNTER — HOSPITAL ENCOUNTER (OUTPATIENT)
Age: 70
Discharge: HOME | End: 2020-04-20
Payer: MEDICARE

## 2020-04-20 LAB
ALBUMIN SERPL-MCNC: 4.5 G/DL (ref 3.5–5.1)
ALP BLD-CCNC: 57 U/L (ref 38–126)
ALT SERPL-CCNC: 21 U/L (ref 11–66)
ANION GAP SERPL CALCULATED.3IONS-SCNC: 13 MEQ/L (ref 8–16)
AST SERPL-CCNC: 23 U/L (ref 5–40)
BILIRUB SERPL-MCNC: 0.6 MG/DL (ref 0.3–1.2)
BUN BLDV-MCNC: 14 MG/DL (ref 7–22)
CALCIUM SERPL-MCNC: 9.9 MG/DL (ref 8.5–10.5)
CHLORIDE BLD-SCNC: 103 MEQ/L (ref 98–111)
CHOLESTEROL, FASTING: 139 MG/DL (ref 100–199)
CO2: 27 MEQ/L (ref 23–33)
CREAT SERPL-MCNC: 1.1 MG/DL (ref 0.4–1.2)
GFR SERPL CREATININE-BSD FRML MDRD: 49 ML/MIN/1.73M2
GLUCOSE FASTING: 100 MG/DL (ref 70–108)
HDLC SERPL-MCNC: 69 MG/DL
LDL CHOLESTEROL CALCULATED: 51 MG/DL
POTASSIUM SERPL-SCNC: 3.6 MEQ/L (ref 3.5–5.2)
SODIUM BLD-SCNC: 143 MEQ/L (ref 135–145)
TOTAL PROTEIN: 7.1 G/DL (ref 6.1–8)
TRIGLYCERIDE, FASTING: 94 MG/DL (ref 0–199)
TSH SERPL DL<=0.05 MIU/L-ACNC: 4.52 UIU/ML (ref 0.4–4.2)

## 2020-04-20 PROCEDURE — 80061 LIPID PANEL: CPT

## 2020-04-20 PROCEDURE — 80053 COMPREHEN METABOLIC PANEL: CPT

## 2020-04-20 PROCEDURE — 84443 ASSAY THYROID STIM HORMONE: CPT

## 2020-04-20 PROCEDURE — 36415 COLL VENOUS BLD VENIPUNCTURE: CPT

## 2020-05-07 ENCOUNTER — HOSPITAL ENCOUNTER (OUTPATIENT)
Dept: PHYSICAL THERAPY | Age: 70
Setting detail: THERAPIES SERIES
Discharge: HOME | End: 2020-05-07
Payer: MEDICARE

## 2020-05-07 PROCEDURE — 97161 PT EVAL LOW COMPLEX 20 MIN: CPT

## 2020-05-07 PROCEDURE — 95992 CANALITH REPOSITIONING PROC: CPT

## 2020-05-07 NOTE — FLOWSHEET NOTE
tests and measures - Low Complexity: 1-2 body structures and functional, activity limitation and/or participation restrictions. See restrictions and objective section above for details. 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 Decision making was based on patient assessment and decision making process in determining plan of care and establishing reasonable expectations for measurable functional outcomes. Evaluation Complexity: Based on the findings of patient history, examination, clinical presentation, and decision making during this evaluation, the evaluation of Vicki Luna  is of low complexity. Goals:  Patient goals :  To get rid of the dizziness    Short term goals  Time Frame for Short term goals: 6 weeks  Short term goal 1: See LTG    Long term goals  Time Frame for Long term goals : 6 weeks  Long term goal 1: Patient to have negative Henri-Hallpike to be able to roll over in bed wihtout dizziness  Long term goal 2: Patient to have negative gaze stability to look all directions without dizziness  Long term goal 3: Patient to report able to perform all daily activity without dizziness    Emelyn Mosley, [address] [place] [address] Drive

## 2020-07-13 NOTE — TELEPHONE ENCOUNTER
Vicki Carrasquillo called requesting a refill on the following medications:  Requested Prescriptions     Pending Prescriptions Disp Refills    hydroxychloroquine (PLAQUENIL) 200 MG tablet 45 tablet 3     Sig: Take 1.5 tablets by mouth daily     [facility]   . pv      Date of last visit: 12/16/19  Date of next visit (if applicable): [id number]

## 2020-07-14 RX ORDER — HYDROXYCHLOROQUINE SULFATE 200 MG/1
300 TABLET, FILM COATED ORAL DAILY
Qty: 45 TABLET | Refills: 3 | Status: SHIPPED | OUTPATIENT
Start: 2020-07-14 | End: 2020-11-11 | Stop reason: SDUPTHER

## 2020-10-08 ENCOUNTER — HOSPITAL ENCOUNTER (OUTPATIENT)
Dept: PHYSICAL THERAPY | Age: 70
Setting detail: THERAPIES SERIES
Discharge: HOME | End: 2020-10-08
Payer: MEDICARE

## 2020-10-08 PROCEDURE — 97161 PT EVAL LOW COMPLEX 20 MIN: CPT

## 2020-10-08 PROCEDURE — 95992 CANALITH REPOSITIONING PROC: CPT

## 2020-10-08 NOTE — FLOWSHEET NOTE
** PLEASE SIGN, DATE AND TIME CERTIFICATION BELOW AND RETURN TO [facility] OUTPATIENT REHABILITATION (FAX #: [phone]). ATTEST/CO-SIGN IF ACCESSING VIA INTechnimotion. THANK YOU.**    I certify that I have examined the patient below and determined that Physical Medicine and Rehabilitation service is necessary and that I approve the established plan of care for up to 90 days or as specifically noted. Attestation, signature or co-signature of physician indicates approval of certification requirements.    ________________________ ____________ __________  Physician Signature   Date   Time  Sam Villanueva 58  PHYSICAL THERAPY  [x] VESTIBULAR EVALUATION  [] DAILY NOTE [] PROGRESS NOTE [] DISCHARGE NOTE    [] [address] [facility]   [] Cristoball[address]derashley 90    [x] 6[address] [facility]   [] Kelvin Amabile    Date: 10/8/2020  Patient Name:  Tahira Mcbride  : 1950  MRN: [id number]    Referring Practitioner Gilma West*   Diagnosis BPPV  VERTIGO    Treatment Diagnosis dizziess   Date of Evaluation 10/8/20   Additional Pertinent History History of BPPV in past, HTN, osteoporosis, arthritis      Functional Outcome Measure Used [facility]   Functional Outcome Score 8 (10/8/20)       Insurance: Primary: Payor: Whitley Arriaza /  /  / ,   Secondary: GENERIC COMMERCIAL   Authorization Information: Unlimited visits, iontoHP/CP not covered   Visit # 1, 1/10 for progress note   Visits Allowed: unlimited   Recertification Date:    Physician Follow-Up: PRN   Physician Orders: Physical therapy 3x/week X 3 weeks   History of Present Illness: Patient reports current episode of dizziness has bothered her for 3-4 weeks. Notices it at night when she rolls to the L.     SUBJECTIVE: symptoms increase rolling in bed,     Social/Functional History and Current Status:  Medications and Allergies have been reviewed and are listed on Medical History Questionnaire.     Vicki Carrasquillo lives with spouse in a multiple floor home with 4 steps with handrail. Task Previous Current   ADLs  Independent Independent   IADL's Independent Independent   Ambulation Independent Independent   Transfers Independent Independent   Recreation Independent Independent   Community Integration Independent Independent   Driving Active  Active    Work Retired  Retired       Precautions: none    Pain none   Neck ROM WNL   Vertebral Artery Testing negative   Visual Field normal   Oculomotor/VOR Questionable positive with tracking   Balance SLS>10 sec bilat   gait No gait deviations   Special Testing BPPV Positive L [id number]-[id number], negative on the R. Negative roll test bilat. Negative Rhomberg         TREATMENT   Precautions:    Pain:     X in shaded column indicates activity completed today   Modalities Parameters/  Location  Notes                     Manual Therapy Time/Technique  Notes                     Exercise/Intervention   Notes   [id number]   x Positive L   epleys   X  forpositive L DHP   Habituation ex   X  Demo for roll and side lyaing                                                             Specific Interventions Next Treatment: vestibular rehab    Activity/Treatment Tolerance:  [x]  Patient tolerated treatment well  []  Patient limited by fatigue  []  Patient limited by pain   []  Patient limited by medical complications  []  Other:     Assessment: patient had positive L DHP with lateral nystagmus, treated with epleys  Body Structures/Functions/Activity Limitations:dizziness, balance  Prognosis: good    GOALS:  Patient Goal: no dizziness    Short Term Goals:  Time Frame: see LTG due to short ELOS      Long Term Goals:  Time Frame: 4 weeks  1. No reports of dizziness with rolling, quick head movements, positional changes or visual stimulation  2.   I HEP to achieve above goals      Patient Education:   [x]  HEP/Education Completed: Plan of Care, Goals, 24 hour precautions, habituation ex   Medbridge Access Code:  []  No new Education completed  []  Reviewed Prior HEP      []  Patient verbalized and/or demonstrated understanding of education provided. []  Patient unable to verbalize and/or demonstrate understanding of education provided. Will continue education. [x]  Barriers to learning: none    PLAN:  Treatment Recommendations: Vestibular Rehabilitation    [x]  Plan of care initiated. Plan to see patient 1 times per week for 4 weeks to address the treatment planned outlined above.   []  Continue with current plan of care  []  Modify plan of care as follows:    []  Hold pending physician visit  []  Discharge    Time In 1650   Time Out 1715   Timed Code Minutes: 8 min   Total Treatment Time: 25 min       Electronically Signed by: Juan Trujillo, PT, DPT [id number]

## 2020-10-15 ENCOUNTER — HOSPITAL ENCOUNTER (OUTPATIENT)
Dept: PHYSICAL THERAPY | Age: 70
Setting detail: THERAPIES SERIES
Discharge: HOME | End: 2020-10-15
Payer: MEDICARE

## 2020-10-15 PROCEDURE — 95992 CANALITH REPOSITIONING PROC: CPT

## 2020-10-15 PROCEDURE — 97112 NEUROMUSCULAR REEDUCATION: CPT

## 2020-10-15 NOTE — PROGRESS NOTES
** PLEASE SIGN, DATE AND TIME CERTIFICATION BELOW AND RETURN TO [facility] OUTPATIENT REHABILITATION (FAX #: [phone]). ATTEST/CO-SIGN IF ACCESSING VIA INMimoona. THANK YOU.**    I certify that I have examined the patient below and determined that Physical Medicine and Rehabilitation service is necessary and that I approve the established plan of care for up to 90 days or as specifically noted. Attestation, signature or co-signature of physician indicates approval of certification requirements.    ________________________ ____________ __________  Physician Signature   Date   Time  Sam Villanueva 58  PHYSICAL THERAPY  [x] VESTIBULAR EVALUATION  [] DAILY NOTE [] PROGRESS NOTE [] DISCHARGE NOTE    [] 615 [facility]   [] Douglasjsashley 90    [x] 645 [facility]   [] Kirtell Hand    Date: 10/15/2020  Patient Name:  Shawn Saravia  : 1950  MRN: [id number]    [facility]   Diagnosis BPPV  VERTIGO    Treatment Diagnosis dizziess   Date of Evaluation 10/8/20   Additional Pertinent History History of BPPV in past, HTN, osteoporosis, arthritis      Functional Outcome Measure Used I   Functional Outcome Score 8 (10/8/20)       Insurance: Primary: Payor: Domenic Mcqueen /  /  / ,   Secondary: GENERIC COMMERCIAL   Authorization Information: Unlimited visits, iontoHP/CP not covered   Visit # 1, 1/10 for progress note   Visits Allowed: unlimited   Recertification Date:    Physician Follow-Up: PRN   Physician Orders: Physical therapy 3x/week X 3 weeks   History of Present Illness: Patient reports current episode of dizziness has bothered her for 3-4 weeks. Notices it at night when she rolls to the L.     SUBJECTIVE: notes ringing in the ears a little louder, questioned if related to her vertigo. Noticed dizziness a couple of times with habituation ex to the L.  Symptoms not as bad as at the evaluation     Social/Functional History and Current Status:  Medications and Allergies have been reviewed and are listed on Medical History Questionnaire. TREATMENT   Precautions:    Pain:     X in shaded column indicates activity completed today   Modalities Parameters/  Location  Notes                     Manual Therapy Time/Technique  Notes                     Exercise/Intervention   Notes   ojh-iwto-ntof   X POSITIVE L   epleys    X For positive L DHP   Habituation ex 2X bilat  x Had symptoms 1st trial to the L-up beat and L eye movement. Gaze stabilization 10 ea  x negative                                                      Specific Interventions Next Treatment: vestibular rehab    Activity/Treatment Tolerance:  [x]  Patient tolerated treatment well  []  Patient limited by fatigue  []  Patient limited by pain   []  Patient limited by medical complications  []  Other:     Assessment: symptoms not as pronounced as at eval. Symptoms also noted during habituation ex 1 trial, slight at 2nd trial. No symptoms with gaze stabilization  Body Structures/Functions/Activity Limitations:dizziness, balance  Prognosis: good    GOALS:  Patient Goal: no dizziness    Short Term Goals:  Time Frame: see LTG due to short ELOS      Long Term Goals:  Time Frame: 4 weeks  1. No reports of dizziness with rolling, quick head movements, positional changes or visual stimulation  2. I HEP to achieve above goals      Patient Education:   [x]  HEP/Education Completed: Plan of Care, Goals, 24 hour precautions, habituation ex   Medbridge Access Code:  []  No new Education completed  []  Reviewed Prior HEP      []  Patient verbalized and/or demonstrated understanding of education provided. []  Patient unable to verbalize and/or demonstrate understanding of education provided. Will continue education. [x]  Barriers to learning: none    PLAN:  Treatment Recommendations: Vestibular Rehabilitation    [x]  Plan of care initiated.   Plan to see patient 1 times per week for 4 weeks to address the treatment planned outlined above.   []  Continue with current plan of care  []  Modify plan of care as follows:    []  Hold pending physician visit  []  Discharge    Time In 1543   Time Out 1606   Timed Code Minutes: 23 min   Total Treatment Time: 23 min       Electronically Signed by: Mami Moore, PT, DPT [id number]

## 2020-10-22 ENCOUNTER — HOSPITAL ENCOUNTER (OUTPATIENT)
Dept: PHYSICAL THERAPY | Age: 70
Setting detail: THERAPIES SERIES
Discharge: HOME | End: 2020-10-22
Payer: MEDICARE

## 2020-10-22 PROCEDURE — 97112 NEUROMUSCULAR REEDUCATION: CPT

## 2020-10-22 PROCEDURE — 95992 CANALITH REPOSITIONING PROC: CPT

## 2020-10-22 NOTE — PROGRESS NOTES
Sam Villanueva 58  PHYSICAL THERAPY  [] VESTIBULAR EVALUATION  [x] DAILY NOTE [] PROGRESS NOTE [] DISCHARGE NOTE    [] 615 [facility]   [] Cristobalkatrin 90    [x] [facility]   [] Kelvin Amabile    Date: 10/22/2020  Patient Name:  Tahira Mcbride  : 1950  MRN: [id number]    Referring Practitioner Gilma West*   Diagnosis BPPV  VERTIGO    Treatment Diagnosis dizziess   Date of Evaluation 10/8/20   Additional Pertinent History History of BPPV in past, HTN, osteoporosis, arthritis      Functional Outcome Measure Used [facility]   Functional Outcome Score 6 (10/22/20)-PN      Insurance: Primary: Payor: Whitley Arriaza /  /  / ,   Secondary: Lauren Valente COMMERCIAL   Authorization Information: Unlimited visits, iontoHP/CP not covered   Visit # 3, 3/10 for progress note   Visits Allowed: unlimited   Recertification Date: 39   Physician Follow-Up: PRN   Physician Orders: Physical therapy 3x/week X 3 weeks   History of Present Illness: Patient reports current episode of dizziness has bothered her for 3-4 weeks. Notices it at night when she rolls to the L.     SUBJECTIVE: continues to get an episode of dizziness with habituation ex to the L. Occasional symptoms with supine to sit, rolling to the L. No symptoms with bending over, looking up, head movements, visual stimulation. Social/Functional History and Current Status:  Medications and Allergies have been reviewed and are listed on Medical History Questionnaire. TREATMENT   Precautions:    Pain:     X in shaded column indicates activity completed today   Modalities Parameters/  Location  Notes                     Manual Therapy Time/Technique  Notes                     Exercise/Intervention   Notes   kqr-ojel-xmis   x POSITIVE L-up beat with symptoms lasting about 20 sec   epleys    x For positive L DHP   Habituation ex 5X bilat  x Slight symptom L sidelying to sit.     Gaze stabilization 10 ea negative   appiani    x L for posterior canal                                               Specific Interventions Next Treatment: vestibular rehab    Activity/Treatment Tolerance:  [x]  Patient tolerated treatment well  []  Patient limited by fatigue  []  Patient limited by pain   []  Patient limited by medical complications  []  Other:     Assessment: no symptoms with looking up, bending over, visual stimulation, head turning. Slight symptoms L sidelying to sit initially with habituation ex, none after 1st trial. overal improvement since eval. We will see in 2 weeks for recheck, since she had continued mild symptoms. Body Structures/Functions/Activity Limitations:dizziness, balance  Prognosis: good    GOALS:  Patient Goal: no dizziness    Short Term Goals:  Time Frame: see LTG due to short ELOS      Long Term Goals:  Time Frame: 4 weeks  1. No reports of dizziness with rolling, quick head movements, positional changes or visual stimulation  2. I HEP to achieve above goals      Patient Education:   [x]  HEP/Education Completed: Plan of Care, Goals, 24 hour precautions, habituation ex   Medbridge Access Code:  []  No new Education completed  []  Reviewed Prior HEP      []  Patient verbalized and/or demonstrated understanding of education provided. []  Patient unable to verbalize and/or demonstrate understanding of education provided. Will continue education. [x]  Barriers to learning: none    PLAN:  Treatment Recommendations: Vestibular Rehabilitation    []  Plan of care initiated. Plan to see patient 1 times per week for 4 weeks to address the treatment planned outlined above. [x]  Continue with current plan of care.  Will recheck in 2 weeks for any continued symptoms  []  Modify plan of care as follows:    []  Hold pending physician visit  []  Discharge    Time In 1105   Time Out 1130   Timed Code Minutes: 25 min   Total Treatment Time: 25 min       Electronically Signed by: Clara Meadows, PT, DPT [id number]

## 2020-11-05 ENCOUNTER — HOSPITAL ENCOUNTER (OUTPATIENT)
Dept: PHYSICAL THERAPY | Age: 70
Setting detail: THERAPIES SERIES
Discharge: HOME | End: 2020-11-05
Payer: MEDICARE

## 2020-11-05 PROCEDURE — 95992 CANALITH REPOSITIONING PROC: CPT

## 2020-11-11 RX ORDER — HYDROXYCHLOROQUINE SULFATE 200 MG/1
300 TABLET, FILM COATED ORAL DAILY
Qty: 45 TABLET | Refills: 3 | Status: SHIPPED | OUTPATIENT
Start: 2020-11-11 | End: 2021-03-15 | Stop reason: SDUPTHER

## 2020-11-11 NOTE — TELEPHONE ENCOUNTER
Vicki Carrasquillo called requesting a refill on the following medications:  Requested Prescriptions     Pending Prescriptions Disp Refills    hydroxychloroquine (PLAQUENIL) 200 MG tablet 45 tablet 3     Sig: Take 1.5 tablets by mouth daily     Pharmacy verified: [facility], [place]      Date of last visit: 12/16/19  Date of next visit (if applicable): 68/50/9860

## 2020-11-19 ENCOUNTER — HOSPITAL ENCOUNTER (OUTPATIENT)
Dept: PHYSICAL THERAPY | Age: 70
Setting detail: THERAPIES SERIES
Discharge: HOME | End: 2020-11-19
Payer: MEDICARE

## 2020-11-19 PROCEDURE — 95992 CANALITH REPOSITIONING PROC: CPT

## 2020-11-19 PROCEDURE — 97112 NEUROMUSCULAR REEDUCATION: CPT

## 2020-11-19 NOTE — DISCHARGE SUMMARY
Sam Villanueva 58  PHYSICAL THERAPY  [] VESTIBULAR EVALUATION  [] DAILY NOTE [] PROGRESS NOTE [x] DISCHARGE NOTE    [] 615 [facility]   [] Crist[address]rin [address]    [x] [facility]   [] Terance Snellen    Date: 2020  Patient Name:  Joyce Torres  : 1950  MRN: [id number]    Referring Practitioner Aris Geller*   Diagnosis BPPV  VERTIGO    Treatment Diagnosis dizziess   Date of Evaluation 10/8/20   Additional Pertinent History History of BPPV in past, HTN, osteoporosis, arthritis      Functional Outcome Measure Used I   Functional Outcome Score 4.   20 for DC      Insurance: Primary: Payor: Lee Ann Aviles /  /  / ,   Secondary: GENERIC COMMERCIAL   Authorization Information: Unlimited visits, iontoHP/CP not covered   Visit # 5, 5/10 for progress note   Visits Allowed: unlimited   Recertification Date:    Physician Follow-Up: PRN   Physician Orders: Physical therapy 3x/week X 3 weeks   History of Present Illness: Patient reports current episode of dizziness has bothered her for 3-4 weeks. Notices it at night when she rolls to the L.     SUBJECTIVE: patient about the same as last visit. Will notice it in the evening, also when she does her habituation ex (3 reps). Takes longer for symptoms to occur. Rolling over in bed at night will cause light symptoms. Has been also doing her neck exercises-no symptoms with these. TREATMENT   Precautions:    Pain: no    X in shaded column indicates activity completed today   Modalities Parameters/  Location  Notes                     Manual Therapy Time/Technique  Notes                     Exercise/Intervention   Notes   vxr-pftf-hpib   x POSITIVE L-up beat with symptoms lasting about 20 sec   epleys    x For positive L DHP   Habituation ex 5X bilat  x Slight symptom L sidelying to sit.     Gaze stabilization 10 ea   negative   appiani     L for posterior canal   libratory maneuver for L Continue with current plan of care. Will recheck in 2 weeks for any continued symptoms  [x]  Modify plan of care as follows: wee 1-2 more visits for recheck for continued symptoms as needed   []  Hold pending physician visit  [x]  Discharge. continue with HEP.  Follow up with PCP if symptoms get worse    Time In 1029   Time Out 1052   Timed Code Minutes: 23 min   Total Treatment Time: 23 min       Electronically Signed by: Sheree Andrade, PT, DPT [id number]

## 2020-12-01 ENCOUNTER — HOSPITAL ENCOUNTER (OUTPATIENT)
Dept: MRI IMAGING | Age: 70
Discharge: HOME | End: 2020-12-01
Payer: MEDICARE

## 2020-12-01 PROCEDURE — 6360000004 HC RX CONTRAST MEDICATION: Performed by: NURSE PRACTITIONER

## 2020-12-01 PROCEDURE — A9579 GAD-BASE MR CONTRAST NOS,1ML: HCPCS | Performed by: NURSE PRACTITIONER

## 2020-12-01 PROCEDURE — 70553 MRI BRAIN STEM W/O & W/DYE: CPT

## 2020-12-01 RX ADMIN — GADOTERIDOL 12 ML: 279.3 INJECTION, SOLUTION INTRAVENOUS at 13:29

## 2020-12-16 ENCOUNTER — OFFICE VISIT (OUTPATIENT)
Dept: RHEUMATOLOGY | Age: 70
End: 2020-12-16
Payer: MEDICARE

## 2020-12-16 VITALS
DIASTOLIC BLOOD PRESSURE: 84 MMHG | BODY MASS INDEX: 21.79 KG/M2 | OXYGEN SATURATION: 97 % | WEIGHT: 138.8 LBS | TEMPERATURE: 97.8 F | HEART RATE: 76 BPM | HEIGHT: 67 IN | SYSTOLIC BLOOD PRESSURE: 126 MMHG

## 2020-12-16 PROBLEM — M15.9 PRIMARY OSTEOARTHRITIS INVOLVING MULTIPLE JOINTS: Status: ACTIVE | Noted: 2020-12-16

## 2020-12-16 PROBLEM — M15.0 PRIMARY OSTEOARTHRITIS INVOLVING MULTIPLE JOINTS: Status: ACTIVE | Noted: 2020-12-16

## 2020-12-16 PROCEDURE — G8399 PT W/DXA RESULTS DOCUMENT: HCPCS | Performed by: INTERNAL MEDICINE

## 2020-12-16 PROCEDURE — G8427 DOCREV CUR MEDS BY ELIG CLIN: HCPCS | Performed by: INTERNAL MEDICINE

## 2020-12-16 PROCEDURE — 4040F PNEUMOC VAC/ADMIN/RCVD: CPT | Performed by: INTERNAL MEDICINE

## 2020-12-16 PROCEDURE — 99213 OFFICE O/P EST LOW 20 MIN: CPT | Performed by: INTERNAL MEDICINE

## 2020-12-16 PROCEDURE — 3017F COLORECTAL CA SCREEN DOC REV: CPT | Performed by: INTERNAL MEDICINE

## 2020-12-16 PROCEDURE — G8420 CALC BMI NORM PARAMETERS: HCPCS | Performed by: INTERNAL MEDICINE

## 2020-12-16 PROCEDURE — 1036F TOBACCO NON-USER: CPT | Performed by: INTERNAL MEDICINE

## 2020-12-16 PROCEDURE — 1123F ACP DISCUSS/DSCN MKR DOCD: CPT | Performed by: INTERNAL MEDICINE

## 2020-12-16 PROCEDURE — 1090F PRES/ABSN URINE INCON ASSESS: CPT | Performed by: INTERNAL MEDICINE

## 2020-12-16 PROCEDURE — G8484 FLU IMMUNIZE NO ADMIN: HCPCS | Performed by: INTERNAL MEDICINE

## 2020-12-16 RX ORDER — GABAPENTIN 100 MG/1
100 CAPSULE ORAL 3 TIMES DAILY
COMMUNITY

## 2020-12-16 ASSESSMENT — ENCOUNTER SYMPTOMS
EYE PAIN: 0
WHEEZING: 0
EYE REDNESS: 0
CONSTIPATION: 0
BLOOD IN STOOL: 0
SHORTNESS OF BREATH: 0
NAUSEA: 0
EYES NEGATIVE: 1
DIARRHEA: 0
VOMITING: 0
RESPIRATORY NEGATIVE: 1

## 2020-12-16 NOTE — PROGRESS NOTES
[id number] [facility] Follow Up       Date Of Service: 12/16/2020  Provider: Kayla Murphy DO    Name: Rno Lauren   MRN: [id number]    Chief Complaint(s)      Chief Complaint   Patient presents with    1 Year Follow Up     OSTEOARTHRITIS      The patient reports joint pain in Bilateral hand, right wrist, /cmc joint, Left shoulder, bilateral knees, neck and lower back. Most severe in back. Symptoms started with back pain years ago with associated Right sided radicular sx's and paresthesia. Treated with surgery in 2004 with relif of the joint pains. The joints of the hands, bilateral knees and left shoulder started around 2018 with some swelling along the base of the thumb. Noted deformites of the fingers for the psat year. MGM with similar arthritic hands changes. DENIES redness/warmth of the joints. Pain is up to 8.5/10, localized daily pain in the back and hands. Timing: n/a. Denies AM stiffness. Aggravating: back-- prolonged standing. Left shoulder: occasionally with rotation of head to right. Alleviating: glucosamine/chondrointon. Tylenol. Prior tx: ibuprofen (off b/c CKD). History of Present illness (HPI)    Ron Lauren   is a(n)70 y.o. female with a hx of CKD stage 3, Hypertension, hyperlipidemia, chronic low back pain, osteoarthritis hand here for the f/u evaluation o fhe of osteoarthritis/ polyartharlgia     Increased head, neck  And shoulder pain - starting 3 months ago - MRI brain found menengioma and schedule to see neurosurgery in [facility]. Ongoing pain in the bialteral hands, shoulder, knees, neck and lower back   Pain up to 5.5/10 over the past week with   Aggravating: knee - prolonged weight bearing   Alleviating: tylenol,  Lower back/legs - non-wt bearing, sitting. Denies joint swelling, redness,warmth     Trigger fingers: resolved at this time. Review of Systems    Review of Systems   Constitutional: Negative for fever.    HENT: Negative for congestion, hearing  metoprolol tartrate (LOPRESSOR) 25 MG tablet Take 12.5 mg by mouth 2 times daily      chlorthalidone (HYGROTON) 25 MG tablet Take 25 mg by mouth daily      Misc Natural Products (OSTEO BI-FLEX JOINT SHIELD PO) Take by mouth daily      acetaminophen (TYLENOL) 100 MG/ML solution Take 10 mg/kg by mouth every 4 hours as needed for Fever      Lactobacillus (ACIDOPHILUS PO) Take by mouth daily      atorvastatin (LIPITOR) 20 MG tablet Take 20 mg by mouth daily      Multiple Vitamin (MULTI-VITAMIN PO) Take  by mouth daily.  Multiple Vitamins-Minerals (VISION FORMULA PO) Take  by mouth daily.  CALCIUM CITRATE by Does not apply route 2 times daily        No current facility-administered medications for this visit. PHYSICAL EXAMINATION / OBJECTIVE     Objective:  /84 (Site: Left Upper Arm, Position: Sitting, Cuff Size: Medium Adult)   Pulse 76   Temp 97.8 °F (36.6 °C)   Ht 5' 7.01\" (1.702 m)   Wt 138 lb 12.8 oz (63 kg)   SpO2 97%   BMI 21.73 kg/m²     Physical Exam  Constitutional:       General: She is not in acute distress. Appearance: She is well-developed. She is not diaphoretic. HENT:      Head: Normocephalic and atraumatic. Eyes:      Conjunctiva/sclera: Conjunctivae normal.      Pupils: Pupils are equal, round, and reactive to light. Neck:      Musculoskeletal: Normal range of motion and neck supple. Cardiovascular:      Rate and Rhythm: Normal rate and regular rhythm. Heart sounds: Normal heart sounds. Pulmonary:      Effort: Pulmonary effort is normal. No respiratory distress. Breath sounds: Normal breath sounds. No wheezing or rales. Lymphadenopathy:      Cervical: No cervical adenopathy. Skin:     General: Skin is warm and dry. Neurological:      Mental Status: She is alert and oriented to person, place, and time. Psychiatric:         Behavior: Behavior normal.       Upper extremities: ROM --- intact bilateral upper extremities.   Msk Strength 5/5 in bilateral shoulders, elbow flex/ext. Hands: +b/l  heberden nodes,  - Hyperextension at PIP joints bilateral hands. CMC squaring bilat. W/ overlying warmth. Tender left 3rd DIP. Lower ext: - tender left knee, left MTPs. No swelling. Physical Exam    RAPID3 Composite Score  12/16/2020 --- RAPID 3: 0 + 7 + 0 = 7      Remission: <3  Low Disease Activity: <6  Moderate Disease Activity: >=6 and <=12  High Disease Activity: >12    LABS        CBC  Lab Results   Component Value Date    WBC 7.4 03/20/2019    RBC 4.64 03/20/2019    HGB 14.2 03/20/2019    HCT 43.8 03/20/2019    MCV 94.4 03/20/2019    MCH 30.6 03/20/2019    MCHC 32.4 03/20/2019     03/20/2019       CMP  Lab Results   Component Value Date    CALCIUM 9.9 04/20/2020    LABALBU 4.5 04/20/2020    PROT 7.1 04/20/2020     04/20/2020    K 3.6 04/20/2020    CO2 27 04/20/2020     04/20/2020    BUN 14 04/20/2020    CREATININE 1.1 04/20/2020    ALKPHOS 57 04/20/2020    ALT 21 04/20/2020    AST 23 04/20/2020       HgBA1c: No components found for: HGBA1C    Lab Results   Component Value Date    TSH 4.520 (H) 04/20/2020     Lab Results   Component Value Date    VITD25 47 02/13/2017         Lab Results   Component Value Date    ANASCRN None Detected 03/20/2019     No results found for: SSA  No results found for: SSB  No results found for: ANTI-SMITH  No results found for: DSDNAAB   No results found for: ANTIRNP  No results found for: C3, C4  Lab Results   Component Value Date    CCPAB 3 03/20/2019     Lab Results   Component Value Date    RF < 10 03/20/2019       No components found for: CANCASCRN, APANCASCRN  Lab Results   Component Value Date    SEDRATE 8 03/20/2019     Lab Results   Component Value Date    CRP 0.12 03/20/2019       RADIOLOGY:         ASSESSMENT/PLAN    Assessment   Plan   1. Inflammatory arthritis. -- undiff.    - hands, Left posterior shoulder, knees x 1 year, back pain x several year.  Denies joint redness/warmth + swelling along the [address] 16 joint. XR hands with subluxation of 2nd MCP noted. Joint exam with tender MCPs, PIPs, DIPs and CMC joint bilateral elbows. , + heberden nodes bilat. - plaquenil 300mg daily ( June 2019)       2. Primary osteoarthritis involving multiple joints  - evaluation of secondary causes. --- crystalline, inflammatory , vs other. ? Inflammatory osteoarthritis    - continue tylenol   - discussed topical volteran gel, cymbalta, etc.      3. Cervicalgia - currently resolved. 4. Chronic low back pain --intermittent, localized. hx of back pain prior surgery in 2004, intemrittent low back pain mainly with prolonged standing. No radicular sx's, paresthesias or red flag sx's   - avoiding oral/systemic NSAIDs b/c of CKD   - continue tylenol    5. Trigger finger - resolved     6. Medication monitoring  - Plaquenil eye examination  -- leeann robles --- requesting records. 7. Osteopenia - treated by PCP with alendronate          No follow-ups on file. Electronically signed by Amada Hartman DO on 12/16/2020 at 10:36 AM    New Prescriptions    No medications on file       Thank you for allowing me to participate in the care of this patient. Please call if there are any questions.

## 2020-12-29 ENCOUNTER — TELEPHONE (OUTPATIENT)
Dept: RHEUMATOLOGY | Age: 70
End: 2020-12-29

## 2020-12-29 NOTE — TELEPHONE ENCOUNTER
Please call and ask the optometrist if the plaquenil needs to be stopped given her macular degeneration?              ----- Message -----   From: Warner Kelley CMA ([facility])   Sent: 12/28/2020  10:51 AM EST   To: Zach Cortes DO     Office called- currently closed but left detailed VM. Awaiting call back.

## 2021-02-16 ENCOUNTER — HOSPITAL ENCOUNTER (EMERGENCY)
Age: 71
Discharge: HOME | End: 2021-02-16
Attending: EMERGENCY MEDICINE
Payer: MEDICARE

## 2021-02-16 VITALS
SYSTOLIC BLOOD PRESSURE: 164 MMHG | BODY MASS INDEX: 20.88 KG/M2 | OXYGEN SATURATION: 98 % | HEART RATE: 80 BPM | TEMPERATURE: 98.6 F | RESPIRATION RATE: 14 BRPM | WEIGHT: 133 LBS | DIASTOLIC BLOOD PRESSURE: 76 MMHG | HEIGHT: 67 IN

## 2021-02-16 DIAGNOSIS — M54.50 ACUTE EXACERBATION OF CHRONIC LOW BACK PAIN: Primary | ICD-10-CM

## 2021-02-16 DIAGNOSIS — G89.29 ACUTE EXACERBATION OF CHRONIC LOW BACK PAIN: Primary | ICD-10-CM

## 2021-02-16 PROCEDURE — 99283 EMERGENCY DEPT VISIT LOW MDM: CPT

## 2021-02-16 RX ORDER — CYCLOBENZAPRINE HCL 10 MG
10 TABLET ORAL 3 TIMES DAILY PRN
Qty: 21 TABLET | Refills: 0 | Status: SHIPPED | OUTPATIENT
Start: 2021-02-16 | End: 2021-02-23

## 2021-02-16 ASSESSMENT — PAIN DESCRIPTION - LOCATION: LOCATION: BACK

## 2021-02-16 ASSESSMENT — PAIN DESCRIPTION - ORIENTATION
ORIENTATION: LOWER
ORIENTATION: LOWER

## 2021-02-16 NOTE — ED PROVIDER NOTES
[address] [facility] 2 [address]  Phone: [address]    Chief Complaint   Patient presents with    Back Pain       HPI    Deena Bains is a 65 y.o. female who presents above-noted complaint. Patient had a history of longstanding back issues had prior surgeries. She had worsening low back pain for last couple days. Did have recent brain surgery had a meningioma and was on some hydrocodone. She has been weaning herself off of that. Her back pain started. PAST MEDICAL HISTORY    Past Medical History:   Diagnosis Date    HTN (hypertension)     Hyperlipidemia     Hypertension     Renal insufficiency        SURGICAL HISTORY    Past Surgical History:   Procedure Laterality Date    BACK SURGERY  2004    BRAIN SURGERY  02/04/2021    meningioma       CURRENT MEDICATIONS    Current Outpatient Rx   Medication Sig Dispense Refill    cyclobenzaprine (FLEXERIL) 10 MG tablet Take 1 tablet by mouth 3 times daily as needed for Muscle spasms 21 tablet 0    gabapentin (NEURONTIN) 100 MG capsule Take 100 mg by mouth 3 times daily.  hydroxychloroquine (PLAQUENIL) 200 MG tablet Take 1.5 tablets by mouth daily 45 tablet 3    alendronate (FOSAMAX) 70 MG tablet take 1 tablet by mouth every week  1    metoprolol tartrate (LOPRESSOR) 25 MG tablet Take 12.5 mg by mouth 2 times daily      chlorthalidone (HYGROTON) 25 MG tablet Take 25 mg by mouth daily      Misc Natural Products (OSTEO BI-FLEX JOINT SHIELD PO) Take by mouth daily      acetaminophen (TYLENOL) 100 MG/ML solution Take 10 mg/kg by mouth every 4 hours as needed for Fever      Lactobacillus (ACIDOPHILUS PO) Take by mouth daily      atorvastatin (LIPITOR) 20 MG tablet Take 20 mg by mouth daily      Multiple Vitamin (MULTI-VITAMIN PO) Take  by mouth daily.  Multiple Vitamins-Minerals (VISION FORMULA PO) Take  by mouth daily.  CALCIUM CITRATE by Does not apply route 2 times daily          ALLERGIES    No Known Allergies    FAMILY HISTORY    Family History   Problem Relation Age of Onset    Other Father         heart disorder, brain aneurysm    High Blood Pressure Father     Heart Attack Maternal Grandfather     Arthritis Mother     Breast Cancer Sister     High Blood Pressure Daughter     High Cholesterol Daughter     High Blood Pressure Daughter     High Cholesterol Daughter     High Blood Pressure Son        SOCIAL HISTORY    Social History     Socioeconomic History    Marital status:      Spouse name: None    Number of children: None    Years of education: None    Highest education level: None   Occupational History    None   Social Needs    Financial resource strain: None    Food insecurity     Worry: None     Inability: None    Transportation needs     Medical: None     Non-medical: None   Tobacco Use    Smoking status: Never Smoker    Smokeless tobacco: Never Used   Substance and Sexual Activity    Alcohol use: No    Drug use: No    Sexual activity: None   Lifestyle    Physical activity     Days per week: None     Minutes per session: None    Stress: None   Relationships    Social connections     Talks on phone: None     Gets together: None     Attends Mosque service: None     Active member of club or organization: None     Attends meetings of clubs or organizations: None     Relationship status: None    Intimate partner violence     Fear of current or ex partner: None     Emotionally abused: None     Physically abused: None     Forced sexual activity: None   Other Topics Concern    None   Social History Narrative    None       REVIEW OF SYSTEMS    Positive back pain. No headache neck pain chest pain abdominal pain. No vomiting. No urinary symptoms. No weakness  All systems negative except as marked.      PHYSICAL EXAM VITAL SIGNS: BP (!) 164/76   Pulse 80   Temp 98.6 °F (37 °C) (Temporal)   Resp 14   Ht 5' 7\" (1.702 m)   Wt 133 lb (60.3 kg)   SpO2 98%   BMI 20.83 kg/m²    Constitutional:  Alert not toxic or ill, ambulatory walking around younger than stated age  HENT: COVID mask protection in place normocephalic, Atraumatic,  Cervical Spine: Normal range of motion,  No stridor. No tenderness, Supple,  Eyes:  No discharge or  Swelling,Conjunctiva normal, PERRL, EOMI,  Respiratory: No respiratory distress, Normal breath sounds,  No wheezing, No chest tenderness. Cardiovascular:  Normal heart rate, Normal rhythm, No murmurs, No rubs, No gallops. GI:  No reproducible pain, Bowel sounds normal, Soft, No masses, No pulsatile masses. No tenderness  Musculoskeletal:  Intact distal pulses, No edema, No tenderness, No cyanosis, No clubbing. Good range of motion in all major joints. No tenderness to palpation or major deformities noted. Back: Lumbar paralumbar tenderness. Integument:  Warm, Dry, No erythema, No rash (on exposed areas)   Lymphatic:  No lymphadenopathy noted. Neurologic:  Alert & oriented x 3, Normal motor function, Normal sensory function, No focal deficits noted. Straight leg tests are negative  Psychiatric:  Affect normal, Judgment normal, Mood normal.                   RADIOLOGY    No orders to display       PROCEDURES    none      CONSULTS:  None      CRITICAL CARE:  None    SCREENINGS  BP (!) 164/76   Pulse 80   Temp 98.6 °F (37 °C) (Temporal)   Resp 14   Ht 5' 7\" (1.702 m)   Wt 133 lb (60.3 kg)   SpO2 98%   BMI 20.83 kg/m²        Screening For Hypertension and Follow-up (#317)   previously diagnosed with hypertension and not applicable for screen      Screening For Tobacco Use and Cessation Intervention (#226):   reports that she has never smoked.  She has never used smokeless tobacco.  Non-smoker not applicable for screen      ED COURSE & MEDICAL DECISION MAKING Pertinent Labs & Imaging studies reviewed. (See chart for details)  Patient has low back pain and discomfort. Reproducible. Peers mechanical.  No neurovascular defect. She has had recent brain surgery but nothing else finalized. Not suspicious for spinal leak or other spinal headaches as she is actually complaining more back pain than headache. She has no reported fever. She has been try to wean herself off the hydrocodone. She is anxious about being addicted. She is on a herbal muscle relaxant. I informed her I would try some regular muscle relaxant. She may continue her hydrocodone. FINAL IMPRESSION    1.  Acute exacerbation of chronic low back pain         PATIENT REFERRED TO:  NU Marcos - CNP  [address] [facility]  [phone]    Call   For evaluation      DISCHARGE MEDICATIONS:  New Prescriptions    CYCLOBENZAPRINE (FLEXERIL) 10 MG TABLET    Take 1 tablet by mouth 3 times daily as needed for Muscle spasms           Bhavya Howard MD  02/16/21 9516

## 2021-02-16 NOTE — ED NOTES
Pt alert and oriented. Respirations regular and easy. Prescriptions given and instructed on. Discharge instructions reviewed. States understanding. Pt discharged in satisfactory condition.        Toyin Jackson RN  02/16/21 5422

## 2021-03-15 DIAGNOSIS — M19.90 INFLAMMATORY ARTHRITIS: ICD-10-CM

## 2021-03-15 RX ORDER — HYDROXYCHLOROQUINE SULFATE 200 MG/1
300 TABLET, FILM COATED ORAL DAILY
Qty: 45 TABLET | Refills: 3 | Status: SHIPPED | OUTPATIENT
Start: 2021-03-15 | End: 2021-06-28 | Stop reason: SDUPTHER

## 2021-04-12 ENCOUNTER — HOSPITAL ENCOUNTER (OUTPATIENT)
Dept: PHYSICAL THERAPY | Age: 71
Setting detail: THERAPIES SERIES
Discharge: HOME | End: 2021-04-12
Payer: MEDICARE

## 2021-04-12 PROCEDURE — 97161 PT EVAL LOW COMPLEX 20 MIN: CPT

## 2021-04-12 PROCEDURE — 97110 THERAPEUTIC EXERCISES: CPT

## 2021-04-12 NOTE — FLOWSHEET NOTE
** PLEASE SIGN, DATE AND TIME CERTIFICATION BELOW AND RETURN TO [facility] OUTPATIENT REHABILITATION (FAX #: [phone]). ATTEST/CO-SIGN IF ACCESSING VIA [employer]. THANK YOU.**    I certify that I have examined the patient below and determined that Physical Medicine and Rehabilitation service is necessary and that I approve the established plan of care for up to 90 days or as specifically noted. Attestation, signature or co-signature of physician indicates approval of certification requirements.    ________________________ ____________ __________  Physician Signature   Date   Time  [address] [facility]  PHYSICAL THERAPY  [x] EVALUATION  [] DAILY NOTE (LAND) [] DAILY NOTE (AQUATIC ) [] PROGRESS NOTE [] DISCHARGE NOTE    [] [facility]   [] Saskia [address]    [x] [address] [facility]   [] Edy Hernandez    Date: 2021  Patient Name:  Bianca Galindo  : 1950  MRN: [id number]  CSN: [id number]    Referring Practitioner Rossy Aguila*   Diagnosis LOW BACK PAIN     Treatment Diagnosis Lumbago, Core weakness   Date of Evaluation 21    Additional Pertinent History Brain tumor - had surgically removed 2021 and was benign. High BP, Vertigo, Arthritis, Osteoporosis. Functional Outcome Measure Used Hoda Huddlestonon   Functional Outcome Score  (21)       Insurance: Primary: Payor: Gabi Be /  /  / ,   Secondary: Cj Yuan Information: Unlimited visits. Aquatics and modalities except Ionto and HP/CP covered. Telehealth covered. Visit # 1, 1/10 for progress note   Visits Allowed: Unlimited   Recertification Date: 98   Physician Follow-Up: No follow up with family doctor. Sees brain surgeon in May 2021 and will have an MRI and then if clear will just have yearly checkups. Physician Orders:    History of Present Illness:      SUBJECTIVE: Patient reports has had back pain off and on for years. No   My appetite is not very good because of the pain in my back. No   I have trouble putting on my socks (or stockings) because of the pain in my back. No   I only walk short distances because of the pain in my back. No   I sleep less because of the pain in my back. No   Because of the pain in my back, I get dressed with help from someone else. No   I sit down most of the day because of the pain in my back. No   I avoid heavy jobs around the house because of the pain in my back. No   Because of the pain in my back, I am more irritable and bad tempered with people. Yes   Because of the pain in my back, I go upstairs more slowly than usual. No   I stay in bed most of the time because of the pain in my back. No   TOTAL NUMBER OF YES RESPONSES 4/24       OBJECTIVE:    Pain: Pain fluctuates from 0-9/10 in low back   Palpation Mild tenderness across low back and into bilateral buttocks more on left side   Observation Iliac crests level in standing. No scoliosis noted. Decreased lordosis. No leg length difference in supine. Posture Fair       Range of Motion Lumbar all motions limited 25-50% and sore. Strength Bilateral hips 4/5 and rest of legs 5/5 throughout. Moderate core weakness. Coordination    Sensation Has had leg pain in the past but has been abolished for awhile   Bed Mobility    Transfers    Ambulation Normal gait pattern   Stairs Able to use reciprocal pattern. Balance No issues noted.    Special Tests          TREATMENT   Precautions: None   Pain: None    X in shaded column indicates activity completed today   Modalities Parameters/  Location  Notes                     Manual Therapy Time/Technique  Notes                     Exercise/Intervention   Notes   Stretches: HS, LTR and piriformis   X Educated on how to perform and had her demonstrate   Seated abdominal bracing and LAQ 10x Hold bracing 5 seconds X    Educated on log roll to get out of bed   X Specific Interventions Next Treatment: Core and hip strengthening, posture education, E-stim if needed but no US due to recent tumor removal, stretching. Body mechanics education. Activity/Treatment Tolerance:  [x]  Patient tolerated treatment well  []  Patient limited by fatigue  []  Patient limited by pain   []  Patient limited by medical complications  []  Other:     Assessment: Patient with history of back pain that has become more constant. Patient was used to being very active and then had to sit and decrease her activity after her brain surgery. Patient's back may ave gotten weak from more sitting around leading to increased inflammation of her arthritis and causing increased pain. Patient may need to just work on strengthening back up her core and giving her back more stability to help decrease the stress and strain and thereby the pain. Body Structures/Functions/Activity Limitations: impaired activity tolerance, impaired endurance, impaired ROM, impaired strength and pain  Prognosis: good    GOALS:  Patient Goal: To try and lessen the pain. Short Term Goals:  Time Frame: 4 weeks  1) Patient to report 25-50% decrease in overall back pain and no more sharp shooting pains to be able to play with her grandchildren in the yard  2) Patient to demonstrate proper body mechanics with all activity to decrease stress on her back with cleaning her home  3) Patient to demonstrate all stretches properly to allow for full muscle motion to decrease pulling on her spine with daily activity. 4) Patient to start strength program without increase in pain to perform all yardwork      Long Term Goals:  Time Frame: 12 weeks  1) Patient to be independent with home program to perform all daily activity with minimal to no pain.        Patient Education:   [x]  HEP/Education Completed: Plan of Care, Goals, HEP above  [address] Access Code: [email or web address]     []  No new Education completed  []  Reviewed Prior HEP      [x]  Patient verbalized and/or demonstrated understanding of education provided. []  Patient unable to verbalize and/or demonstrate understanding of education provided. Will continue education. []  Barriers to learning: None    PLAN:  Treatment Recommendations: Strengthening, Range of Motion, Functional Mobility Training, Neuromuscular Re-education, Manual Therapy - Soft Tissue Mobilization, Pain Management, Home Exercise Program, Patient Education and Modalities    [x]  Plan of care initiated. Plan to see patient 2 times per week for 12 weeks to address the treatment planned outlined above.   []  Continue with current plan of care  []  Modify plan of care as follows:    []  Hold pending physician visit  []  Discharge    Time In 0900   Time Out 1000   Timed Code Minutes: 10 min   Total Treatment Time: 60 min       Electronically Signed by: [place] [id number]

## 2021-04-16 ENCOUNTER — HOSPITAL ENCOUNTER (OUTPATIENT)
Dept: PHYSICAL THERAPY | Age: 71
Setting detail: THERAPIES SERIES
Discharge: HOME | End: 2021-04-16
Payer: MEDICARE

## 2021-04-16 PROCEDURE — 97110 THERAPEUTIC EXERCISES: CPT

## 2021-04-16 NOTE — PROGRESS NOTES
Shoulder flexion                 Shoulder abduction                 Shoulder horz abd                 Rows                 Thoracic extension 10x  10x  10x  10x  10x  10x  10x  10x  10x 5 sec      5 sec       X  X  X  X  X  X  X  X  X    Discussed log roll to get out of bed   X Patient reports trying to work on at home                                                             Specific Interventions Next Treatment: Core and hip strengthening, posture education, E-stim if needed but no US due to recent tumor removal, stretching. Body mechanics education. Activity/Treatment Tolerance:  [x]  Patient tolerated treatment well  []  Patient limited by fatigue  []  Patient limited by pain   []  Patient limited by medical complications  []  Other:     Assessment: Patient with increased pain today. Able to add in new seated strengthening for core and able to demonstrate exercises and stretches given at eval. Patient needing cueing for posture and performance and to not overdo. States no pain at end of session. GOALS:  Patient Goal: To try and lessen the pain. Short Term Goals:  Time Frame: 4 weeks  1) Patient to report 25-50% decrease in overall back pain and no more sharp shooting pains to be able to play with her grandchildren in the yard  2) Patient to demonstrate proper body mechanics with all activity to decrease stress on her back with cleaning her home  3) Patient to demonstrate all stretches properly to allow for full muscle motion to decrease pulling on her spine with daily activity. 4) Patient to start strength program without increase in pain to perform all yardwork      Long Term Goals:  Time Frame: 12 weeks  1) Patient to be independent with home program to perform all daily activity with minimal to no pain. Patient Education:   [x]  HEP/Education Completed: Continue with HEP but does not have to do all exercises at once.   [employer] Access Code: [email or web address]     []  No new Education completed  [] Reviewed Prior HEP      [x]  Patient verbalized and/or demonstrated understanding of education provided. []  Patient unable to verbalize and/or demonstrate understanding of education provided. Will continue education. []  Barriers to learning: None    PLAN:  [x]  Plan of care initiated. Plan to see patient 2 times per week for 12 weeks to address the treatment planned outlined above.   []  Continue with current plan of care  []  Modify plan of care as follows:    []  Hold pending physician visit  []  Discharge    Time In 1345   Time Out 1415   Timed Code Minutes: 30 min   Total Treatment Time: 30 min       Electronically Signed by: [place] [id number]

## 2021-04-21 ENCOUNTER — HOSPITAL ENCOUNTER (OUTPATIENT)
Dept: PHYSICAL THERAPY | Age: 71
Setting detail: THERAPIES SERIES
Discharge: HOME | End: 2021-04-21
Payer: MEDICARE

## 2021-04-21 PROCEDURE — 97110 THERAPEUTIC EXERCISES: CPT

## 2021-04-21 NOTE — PROGRESS NOTES
Seated: abdominal bracing                LAQ                 Marching                 Adduction squeezes                 Shoulder flexion                 Shoulder abduction                 Shoulder horz abd                 Rows                 Thoracic extension 10x  10x  10x  10x  10x  10x  10x  10x  10x 5 sec      5 sec       X  X  X  X  X  X  X  X  X    Discussed and educated on sleeping positions and pillow between the knees or using a body pillow   X           Standing: Heel raises                  Marching                  Hip flexion                  Hip abduction                  Squats 10x  10x  10x  10x  10x  X  X  X  X  X    Supine: Bridging 10x  X                                         Specific Interventions Next Treatment: Core and hip strengthening, posture education, E-stim if needed but no US due to recent tumor removal, stretching. Body mechanics education. Activity/Treatment Tolerance:  [x]  Patient tolerated treatment well  []  Patient limited by fatigue  []  Patient limited by pain   []  Patient limited by medical complications  []  Other:     Assessment: Patient started standing exercises today without production of pain. Patient reported some increased soreness in back with new activity. Patient needing cueing for posture and mechanics. Will continue to progress strengthening for stability as patient can tolerate. GOALS:  Patient Goal: To try and lessen the pain. Short Term Goals:  Time Frame: 4 weeks  1) Patient to report 25-50% decrease in overall back pain and no more sharp shooting pains to be able to play with her grandchildren in the yard  2) Patient to demonstrate proper body mechanics with all activity to decrease stress on her back with cleaning her home  3) Patient to demonstrate all stretches properly to allow for full muscle motion to decrease pulling on her spine with daily activity.   4) Patient to start strength program without increase in pain to perform all

## 2021-04-23 ENCOUNTER — HOSPITAL ENCOUNTER (OUTPATIENT)
Dept: PHYSICAL THERAPY | Age: 71
Setting detail: THERAPIES SERIES
Discharge: HOME | End: 2021-04-23
Payer: MEDICARE

## 2021-04-23 PROCEDURE — 97110 THERAPEUTIC EXERCISES: CPT

## 2021-04-23 NOTE — PROGRESS NOTES
[address] [facility]  PHYSICAL THERAPY  [] EVALUATION  [x] DAILY NOTE (LAND) [] DAILY NOTE (AQUATIC ) [] PROGRESS NOTE [] DISCHARGE NOTE    [] [address] [facility]   [] [address]    [x] [facility]   [] Clark Rivera    Date: 2021  Patient Name:  Xander Naranjo  : 1950  MRN: [id number]  CSN: [id number]    Referring Practitioner Dinesh Jacques*   Diagnosis LOW BACK PAIN     Treatment Diagnosis Lumbago, Core weakness   Date of Evaluation 21    Additional Pertinent History Brain tumor - had surgically removed 2021 and was benign. High BP, Vertigo, Arthritis, Osteoporosis. Functional Outcome Measure Used Joanna Salazar   Functional Outcome Score  (21)       Insurance: Primary: Payor: Laisha Uriostegui /  /  / ,   Secondary: Loren Schaumann Information: Unlimited visits. Aquatics and modalities except Ionto and HP/CP covered. Telehealth covered. Visit # 4, 4/10 for progress note   Visits Allowed: Unlimited   Recertification Date:    Physician Follow-Up: No follow up with family doctor. Sees brain surgeon in May 2021 and will have an MRI and then if clear will just have yearly checkups. Physician Orders:    History of Present Illness:      SUBJECTIVE: Patient reports feeling great this morning and not having any pain. States exercises are going well and no trouble or pain increase.      TREATMENT   Precautions: None   Pain: None    X in shaded column indicates activity completed today   Modalities Parameters/  Location  Notes                     Manual Therapy Time/Technique  Notes                     Exercise/Intervention   Notes   Stretches: HS and piriformis                    LTR 3x  10x bilat 15 sec    Done for HEP   Seated: abdominal bracing                LAQ                 Marching                 Adduction squeezes                 Shoulder flexion                 Shoulder abduction Shoulder horz abd                 Rows                 Thoracic extension 10x  15x  15x  15x  15x  15x  15x  15x  15x 5 sec      5 sec       X  X  X  X  X  X  X  X  X    Discussed and educated on sleeping positions and pillow between the knees or using a body pillow   X           Standing: Heel raises                  Marching                  Hip flexion                  Hip abduction                  Squats 15x  15x  15x  15x  10x  X  X  X  X  X    Supine: Bridging 10x             Lunges onto BOSU 10x bilat  X    Rockerboard 2 directions 10x each  X 30 second balance each way          Balance: tandem stance 30 sec each way  X      Specific Interventions Next Treatment: Core and hip strengthening, posture education, E-stim if needed but no US due to recent tumor removal, stretching. Body mechanics education. Activity/Treatment Tolerance:  [x]  Patient tolerated treatment well  []  Patient limited by fatigue  []  Patient limited by pain   []  Patient limited by medical complications  []  Other:     Assessment: Patient doing well today so increased reps to 15 today and added in a few standing exercises. Patient reported some increased soreness in her central low back after session but no pain. Educated to increase reps at home but hold off today sicne started new activity. Patient doing well watching posture and body mechanics. GOALS:  Patient Goal: To try and lessen the pain. Short Term Goals:  Time Frame: 4 weeks  1) Patient to report 25-50% decrease in overall back pain and no more sharp shooting pains to be able to play with her grandchildren in the yard  2) Patient to demonstrate proper body mechanics with all activity to decrease stress on her back with cleaning her home  3) Patient to demonstrate all stretches properly to allow for full muscle motion to decrease pulling on her spine with daily activity.   4) Patient to start strength program without increase in pain to perform all

## 2021-04-28 ENCOUNTER — HOSPITAL ENCOUNTER (OUTPATIENT)
Dept: PHYSICAL THERAPY | Age: 71
Setting detail: THERAPIES SERIES
Discharge: HOME | End: 2021-04-28
Payer: MEDICARE

## 2021-04-28 PROCEDURE — 97110 THERAPEUTIC EXERCISES: CPT

## 2021-04-28 PROCEDURE — 97112 NEUROMUSCULAR REEDUCATION: CPT

## 2021-04-28 NOTE — PROGRESS NOTES
[address] [facility]  PHYSICAL THERAPY  [] EVALUATION  [x] DAILY NOTE (LAND) [] DAILY NOTE (AQUATIC ) [] PROGRESS NOTE [] DISCHARGE NOTE    [] [facility]   [] Saskia 90    [x] [facility]   [] Quiana Busby    Date: 2021  Patient Name:  Roderick Pelaez  : 1950  MRN: [id number]  CSN: [id number]    Referring Practitioner Marisela Ward*   Diagnosis LOW BACK PAIN     Treatment Diagnosis Lumbago, Core weakness   Date of Evaluation 21    Additional Pertinent History Brain tumor - had surgically removed 2021 and was benign. High BP, Vertigo, Arthritis, Osteoporosis. Functional Outcome Measure Used [facility]   Functional Outcome Score  (21)       Insurance: Primary: Payor: Maged Vietnamese /  /  / ,   Secondary: Belkis Blandon Information: Unlimited visits. Aquatics and modalities except Ionto and HP/CP covered. Telehealth covered. Visit # 5, 5/10 for progress note   Visits Allowed: Unlimited   Recertification Date: 04   Physician Follow-Up: No follow up with family doctor. Sees brain surgeon in May 2021 and will have an MRI and then if clear will just have yearly checkups. Physician Orders:    History of Present Illness:      SUBJECTIVE: Patient reports feeling pretty good this morning. States has been feeling much better overall with very little to no pain. States last night standing at the track meet her back started to hurt and she had some pain moving in bed last night but this morning she is fine. States does not always know when is going to have pain.      TREATMENT   Precautions: None   Pain: None    X in shaded column indicates activity completed today   Modalities Parameters/  Location  Notes                     Manual Therapy Time/Technique  Notes                     Exercise/Intervention   Notes   Stretches: HS and piriformis                    LTR 3x  10x bilat 15 sec Done for HEP   Seated on blue thera-ball: abdominal bracing                                            LAQ                                             Marching                                            Adduction squeezes                                            Shoulder flexion                                            Shoulder abduction                                            Shoulder horz abd                                            Rows  Seated in chair:Thoracic extension over ball 10x  15x  15x  15x  15x  15x  15x  15x  15x 5 sec      5 sec       X  X  X  X  X  X  X  X  X    Discussed and educated on sleeping positions and pillow between the knees or using a body pillow   X           Standing on blue foam: Heel raises                                        Marching                                        Hip flexion                                        Hip abduction                                        Squats 15x  15x  15x  15x  10x  X  X  X  X  X    Supine: Bridging 10x             Lunges onto BOSU 10x bilat  X    Rockerboard 2 directions 10x each  X 30 second balance each way          Balance: tandem stance 30 sec each way  X      Specific Interventions Next Treatment: Core and hip strengthening, posture education, E-stim if needed but no US due to recent tumor removal, stretching. Body mechanics education. Activity/Treatment Tolerance:  [x]  Patient tolerated treatment well  []  Patient limited by fatigue  []  Patient limited by pain   []  Patient limited by medical complications  []  Other:     Assessment: Patient's pain continuing to improve and happen less often. She reports is not getting the \"zinging\" pain out to the sides that she was getting. PT added in foam and thera-ball under her with exercises today to challenge her balance and stability a little more. Patient able to do without producing pain. Patient stated back just a little sore after session.  Will see if makes a difference with standing by changing her posture. GOALS:  Patient Goal: To try and lessen the pain. Short Term Goals:  Time Frame: 4 weeks  1) Patient to report 25-50% decrease in overall back pain and no more sharp shooting pains to be able to play with her grandchildren in the yard  2) Patient to demonstrate proper body mechanics with all activity to decrease stress on her back with cleaning her home  3) Patient to demonstrate all stretches properly to allow for full muscle motion to decrease pulling on her spine with daily activity. 4) Patient to start strength program without increase in pain to perform all yardwork      Long Term Goals:  Time Frame: 12 weeks  1) Patient to be independent with home program to perform all daily activity with minimal to no pain. Patient Education:   [x]  HEP/Education Completed: Continue with HEP. Try changing angle of hips or doing a slight lean forward when standing for longer periods of time. [employer] Access Code: [email or web address]     []  No new Education completed  []  Reviewed Prior HEP      [x]  Patient verbalized and/or demonstrated understanding of education provided. []  Patient unable to verbalize and/or demonstrate understanding of education provided. Will continue education. []  Barriers to learning: None    PLAN:  []  Plan of care initiated. Plan to see patient 2 times per week for 12 weeks to address the treatment planned outlined above.   [x]  Continue with current plan of care  []  Modify plan of care as follows:    []  Hold pending physician visit  []  Discharge    Time In 0903   Time Out 0933   Timed Code Minutes: 30 min   Total Treatment Time: 30 min       Electronically Signed by: [place] [id number]

## 2021-04-30 ENCOUNTER — HOSPITAL ENCOUNTER (OUTPATIENT)
Dept: PHYSICAL THERAPY | Age: 71
Setting detail: THERAPIES SERIES
Discharge: HOME | End: 2021-04-30
Payer: MEDICARE

## 2021-04-30 PROCEDURE — 97110 THERAPEUTIC EXERCISES: CPT

## 2021-04-30 PROCEDURE — 97112 NEUROMUSCULAR REEDUCATION: CPT

## 2021-04-30 NOTE — PROGRESS NOTES
Marching                                            Adduction squeezes                                            Shoulder flexion                                            Shoulder abduction                                            Shoulder horz abd                                            Rows  Seated in chair:Thoracic extension over ball 10x  15x  15x  15x  15x  15x  15x  15x  15x 5 sec      5 sec       X  X  X  X  X  X  X  X  X    Discussed and educated on sleeping positions and pillow between the knees or using a body pillow                Standing on blue foam: Heel raises                                        Marching                                        Hip flexion                                        Hip abduction                                        Squats 15x  15x  15x  15x  10x  X  X  X  X  X    Supine: Bridging 10x      Bike Seat 3 5 minutes  X    Lunges onto BOSU 10x bilat  X    Rockerboard 2 directions 10x each  X 30 second balance each way          Balance: tandem stance 30 sec each way  X      Specific Interventions Next Treatment: Core and hip strengthening, posture education, E-stim if needed but no US due to recent tumor removal, stretching. Body mechanics education. Activity/Treatment Tolerance:  [x]  Patient tolerated treatment well  []  Patient limited by fatigue  []  Patient limited by pain   []  Patient limited by medical complications  []  Other:     Assessment: Patient started bike today without any production of pain. Patient dong better with exercises with unsteady surfaces added under her. No pain production at end of therapy today. GOALS:  Patient Goal: To try and lessen the pain.     Short Term Goals:  Time Frame: 4 weeks  1) Patient to report 25-50% decrease in overall back pain and no more sharp shooting pains to be able to play with her grandchildren in the yard  2) Patient to demonstrate proper body mechanics with all activity to decrease stress on her back with cleaning her home  3) Patient to demonstrate all stretches properly to allow for full muscle motion to decrease pulling on her spine with daily activity. 4) Patient to start strength program without increase in pain to perform all yardwork      Long Term Goals:  Time Frame: 12 weeks  1) Patient to be independent with home program to perform all daily activity with minimal to no pain. Patient Education:   [x]  HEP/Education Completed: Continue with HEP. Try changing angle of hips or doing a slight lean forward when standing for longer periods of time. Se how feels after riding bike today. [employer] Access Code: [email or web address]     []  No new Education completed  []  Reviewed Prior HEP      [x]  Patient verbalized and/or demonstrated understanding of education provided. []  Patient unable to verbalize and/or demonstrate understanding of education provided. Will continue education. []  Barriers to learning: None    PLAN:  []  Plan of care initiated. Plan to see patient 2 times per week for 12 weeks to address the treatment planned outlined above.   [x]  Continue with current plan of care  []  Modify plan of care as follows:    []  Hold pending physician visit  []  Discharge    Time In [phone]   Time Out 8055   Timed Code Minutes: 30 min   Total Treatment Time: 30 min       Electronically Signed by: [place] [id number]

## 2021-05-05 ENCOUNTER — HOSPITAL ENCOUNTER (OUTPATIENT)
Dept: PHYSICAL THERAPY | Age: 71
Setting detail: THERAPIES SERIES
End: 2021-05-05
Payer: MEDICARE

## 2021-05-07 ENCOUNTER — HOSPITAL ENCOUNTER (OUTPATIENT)
Dept: PHYSICAL THERAPY | Age: 71
Setting detail: THERAPIES SERIES
Discharge: HOME | End: 2021-05-07
Payer: MEDICARE

## 2021-05-07 PROCEDURE — 97110 THERAPEUTIC EXERCISES: CPT

## 2021-05-07 NOTE — DISCHARGE SUMMARY
[address] [facility]  PHYSICAL THERAPY  [] EVALUATION  [] DAILY NOTE (LAND) [] DAILY NOTE (AQUATIC ) [] PROGRESS NOTE [x] DISCHARGE NOTE    [] [facility]   [] Saskia     [x] [facility]   [] Gaudencio Wong    Date: 2021  Patient Name:  Enrrique Glover  : 1950  MRN: [id number]  CSN: [id number]    Referring Practitioner Alea Russo*   Diagnosis LOW BACK PAIN     Treatment Diagnosis Lumbago, Core weakness   Date of Evaluation 21    Additional Pertinent History Brain tumor - had surgically removed 2021 and was benign. High BP, Vertigo, Arthritis, Osteoporosis. [facility]   Functional Outcome Score  (21), Discharge  (21)      Insurance: Primary: Payor: Jaxon Farmer /  /  / ,   Secondary: Dorothy Kuo Information: Unlimited visits. Aquatics and modalities except Ionto and HP/CP covered. Telehealth covered. Visit # 7, 7/10 for progress note   Visits Allowed: Unlimited   Recertification Date: 09   Physician Follow-Up: No follow up with family doctor. Sees brain surgeon in May 2021 and will have an MRI and then if clear will just have yearly checkups. Physician Orders:    History of Present Illness:      SUBJECTIVE: Patient reports was sick this week so was not able to do her HEP but had been doing really good with it.  feels the therapy has helped and she is having less pain.  still gets random sharp pains but they are coming less often and do not last long.  does not feel needs more therapy after today and she can do her HEP on her own. Lamonte Vinh Disability Scale for Low Back Pain   I stay at home most of the time because of the pain in my back. No   I change position frequently to try and make my back comfortable. Yes   I walk more slowly than usual because of the pain in my back.  Yes   Because of the pain in thera-ball: abdominal bracing                                            LAQ                                             Marching                                            Adduction squeezes                                            Shoulder flexion                                            Shoulder abduction                                            Shoulder horz abd                                            Rows  Seated in chair:Thoracic extension over ball 10x  15x  15x  15x  15x  15x  15x  15x  15x 5 sec      5 sec                           Discussed and educated on sleeping positions and pillow between the knees or using a body pillow                Standing on blue foam: Heel raises                                        Marching                                        Hip flexion                                        Hip abduction                                        Squats 15x  15x  15x  15x  10x              Supine: Bridging 10x      Bike Seat 3 5 minutes  X    Lunges onto BOSU 10x bilat  X    Rockerboard 2 directions 10x each  X 30 second balance each way          Balance: tandem stance 30 sec each way      Discussed HEP and educated on what to continue with   X      Specific Interventions Next Treatment: No further treatments    Activity/Treatment Tolerance:  [x]  Patient tolerated treatment well  []  Patient limited by fatigue  []  Patient limited by pain   []  Patient limited by medical complications  []  Other:     Assessment: Patient has made great progress with therapy. She has had a significant decrease in pain and the pain that she still gets is not as often and does not last as long. She is able to be independent with her HEP and her posture correction and so no more therapy needed. On the Lamonte-Vinh some of the questions she answered yes to are not causing her as much trouble or pain but she still has limitations occasionally due to pain but are not as often.  Patient is meeting most of her goals or has made significant gains towards meeting them. GOALS:  Patient Goal: To try and lessen the pain. Short Term Goals:  Time Frame: 4 weeks  1) Patient to report 25-50% decrease in overall back pain and no more sharp shooting pains to be able to play with her grandchildren in the yard  [] Goal Met [x] Goal Not Met [] Continue Goal [x] Discontinue Goal  [] Revise Goal  Goal Assessment: Patient reports is 50% better so has met that part of the goal. Patient still gets random sharp pains but are not happening as often and are not lasting as long. 2) Patient to demonstrate proper body mechanics with all activity to decrease stress on her back with cleaning her home  [x] Goal Met [] Goal Not Met [] Continue Goal [x] Discontinue Goal  [] Revise Goal  3) Patient to demonstrate all stretches properly to allow for full muscle motion to decrease pulling on her spine with daily activity. [x] Goal Met [] Goal Not Met [] Continue Goal [x] Discontinue Goal  [] Revise Goal  4) Patient to start strength program without increase in pain to perform all yardwork  [x] Goal Met [] Goal Not Met [] Continue Goal [x] Discontinue Goal  [] Revise Goal      Long Term Goals:  Time Frame: 12 weeks  1) Patient to be independent with home program to perform all daily activity with minimal to no pain. [x] Goal Met [] Goal Not Met [] Continue Goal [x] Discontinue Goal  [] Revise Goal      Patient Education:   [x]  HEP/Education Completed: Continue with HEP and watching posture. Call with any questions. [employer] Access Code: [email or web address]     []  No new Education completed  []  Reviewed Prior HEP      [x]  Patient verbalized and/or demonstrated understanding of education provided. []  Patient unable to verbalize and/or demonstrate understanding of education provided. Will continue education. []  Barriers to learning: None    PLAN:  []  Plan of care initiated.   Plan to see patient 2 times per week for 12 weeks to address the treatment planned outlined above.   []  Continue with current plan of care  []  Modify plan of care as follows:    []  Hold pending physician visit  [x]  Discharge    Time In 0905   Time Out 0935   Timed Code Minutes: 30 min   Total Treatment Time: 30 min       Electronically Signed by: [place] [id number]

## 2021-06-28 DIAGNOSIS — M19.90 INFLAMMATORY ARTHRITIS: ICD-10-CM

## 2021-06-28 RX ORDER — HYDROXYCHLOROQUINE SULFATE 200 MG/1
300 TABLET, FILM COATED ORAL DAILY
Qty: 45 TABLET | Refills: 3 | Status: SHIPPED | OUTPATIENT
Start: 2021-06-28 | End: 2021-07-19 | Stop reason: SDUPTHER

## 2021-06-28 NOTE — TELEPHONE ENCOUNTER
Vicki Carrasquillo called requesting a refill on the following medications:  Requested Prescriptions     Pending Prescriptions Disp Refills    hydroxychloroquine (PLAQUENIL) 200 MG tablet 45 tablet 3     Sig: Take 1.5 tablets by mouth daily     Pharmacy verified:  .pv  [facility] in [place], [place]    Date of last visit: 12/16/2020  Date of next visit (if applicable): 11/32/8559

## 2021-07-19 DIAGNOSIS — M19.90 INFLAMMATORY ARTHRITIS: ICD-10-CM

## 2021-07-19 RX ORDER — HYDROXYCHLOROQUINE SULFATE 200 MG/1
300 TABLET, FILM COATED ORAL DAILY
Qty: 45 TABLET | Refills: 3 | Status: SHIPPED | OUTPATIENT
Start: 2021-07-19 | End: 2022-03-07 | Stop reason: SDUPTHER

## 2021-12-22 ENCOUNTER — OFFICE VISIT (OUTPATIENT)
Dept: RHEUMATOLOGY | Age: 71
End: 2021-12-22
Payer: MEDICARE

## 2021-12-22 VITALS
SYSTOLIC BLOOD PRESSURE: 144 MMHG | WEIGHT: 140.6 LBS | OXYGEN SATURATION: 98 % | BODY MASS INDEX: 22.07 KG/M2 | HEART RATE: 76 BPM | DIASTOLIC BLOOD PRESSURE: 94 MMHG | HEIGHT: 67 IN

## 2021-12-22 DIAGNOSIS — Z51.81 MEDICATION MONITORING ENCOUNTER: ICD-10-CM

## 2021-12-22 DIAGNOSIS — M85.80 OSTEOPENIA, UNSPECIFIED LOCATION: ICD-10-CM

## 2021-12-22 DIAGNOSIS — M65.312 TRIGGER FINGER OF LEFT THUMB: ICD-10-CM

## 2021-12-22 DIAGNOSIS — M19.90 INFLAMMATORY ARTHRITIS: Primary | ICD-10-CM

## 2021-12-22 DIAGNOSIS — M15.9 PRIMARY OSTEOARTHRITIS INVOLVING MULTIPLE JOINTS: ICD-10-CM

## 2021-12-22 PROCEDURE — G8484 FLU IMMUNIZE NO ADMIN: HCPCS | Performed by: INTERNAL MEDICINE

## 2021-12-22 PROCEDURE — G8420 CALC BMI NORM PARAMETERS: HCPCS | Performed by: INTERNAL MEDICINE

## 2021-12-22 PROCEDURE — 1090F PRES/ABSN URINE INCON ASSESS: CPT | Performed by: INTERNAL MEDICINE

## 2021-12-22 PROCEDURE — 1036F TOBACCO NON-USER: CPT | Performed by: INTERNAL MEDICINE

## 2021-12-22 PROCEDURE — G8428 CUR MEDS NOT DOCUMENT: HCPCS | Performed by: INTERNAL MEDICINE

## 2021-12-22 PROCEDURE — 3017F COLORECTAL CA SCREEN DOC REV: CPT | Performed by: INTERNAL MEDICINE

## 2021-12-22 PROCEDURE — 99213 OFFICE O/P EST LOW 20 MIN: CPT | Performed by: INTERNAL MEDICINE

## 2021-12-22 PROCEDURE — 4040F PNEUMOC VAC/ADMIN/RCVD: CPT | Performed by: INTERNAL MEDICINE

## 2021-12-22 PROCEDURE — G8399 PT W/DXA RESULTS DOCUMENT: HCPCS | Performed by: INTERNAL MEDICINE

## 2021-12-22 PROCEDURE — 1123F ACP DISCUSS/DSCN MKR DOCD: CPT | Performed by: INTERNAL MEDICINE

## 2021-12-22 ASSESSMENT — ENCOUNTER SYMPTOMS
COUGH: 0
EYE PAIN: 0
VOMITING: 0
EYES NEGATIVE: 1
CONSTIPATION: 0
NAUSEA: 0
EYE REDNESS: 0
WHEEZING: 0
SHORTNESS OF BREATH: 0
DIARRHEA: 0

## 2021-12-22 NOTE — PROGRESS NOTES
[facility] RHEUMATOLOGY FOLLOW UP NOTE     Date Of Service: 12/22/2021  Provider: Sidney Arceo DO ,   PCP: [facility] - [facility]LLERMINA   Name: Michelle Vanegas   MRN: [id number]    ASSESSMENT/PLAN:   1. Inflammatory arthritis  2. Primary osteoarthritis involving multiple joints  -     diclofenac sodium (VOLTAREN) 1 % GEL; Apply 2 g topically 2 times daily, Topical, 2 TIMES DAILY Starting Wed 12/22/2021, Disp-150 g, R-2, Normal  3. Osteopenia, unspecified location  4. Medication monitoring encounter  5. Trigger finger of left thumb    1. Inflammatory arthritis. -- undiff.                - hands, Left posterior shoulder, knees x 1 year, back pain x several year. Denies joint redness/warmth + swelling along the Aia 16 joint. XR hands with subluxation of 2nd MCP noted. Joint exam with tender MCPs, PIPs, DIPs and CMC joint bilateral elbows. , + heberden nodes bilat.                   - plaquenil 300mg daily ( June 2019)    - discussed voltera gel.         2. Primary osteoarthritis involving multiple joints  - evaluation of secondary causes. --- crystalline, inflammatory , vs other. ? Inflammatory osteoarthritis                - continue tylenol               - Rx for volteran gel provieded. 3. Trigger thumb - declined therapy at this time.      4. Chronic low back pain --intermittent, localized. hx of back pain prior surgery in 2004, intemrittent low back pain mainly with prolonged standing. No radicular sx's, paresthesias or red flag sx's               - avoiding oral/systemic NSAIDs b/c of CKD               - continue tylenol     5. Medication monitoring  - Plaquenil eye examination  -- leeann Rowe --- requesting records last in June 2021.   - need repeat cbc, cmp for plaquenil monitoring            Return in about 1 year (around 12/22/2022).   New Prescriptions    DICLOFENAC SODIUM (VOLTAREN) 1 % GEL    Apply 2 g topically 2 times daily     History of Present Illness (HPI)     Chief Complaint   Patient presents with    1 Year Follow Up        Janeth Collet  is a(n)71 y.o. female with a hx of CKD stage 3, Hypertension, hyperlipidemia, chronic low back pain, osteoarthritis hand here for the f/u evaluation o fhe of osteoarthritis/ polyartharlgia        Ongoing pain in the bialteral hands, shoulder, knees, neck and lower back   Pain up to 5/10 over the past week. Aggravating: knee - prolonged weight bearing , back. : wt bearing, hands : increased   Alleviating: tylenol,  Lower back/legs - non-wt bearing, sitting. Denies joint swelling, redness,warmth   Denies trigger finger.      REVIEW OF SYSTEMS: (ROS)    Review of Systems   Constitutional: Negative for diaphoresis, fatigue and fever. HENT: Negative for congestion, hearing loss and nosebleeds. Eyes: Negative. Negative for pain and redness. Respiratory: Negative for cough, shortness of breath and wheezing. Cardiovascular: Negative. Negative for chest pain. Gastrointestinal: Negative for constipation, diarrhea, nausea and vomiting. Genitourinary: Negative for difficulty urinating, frequency and hematuria. Musculoskeletal: Negative for myalgias. Skin: Negative for rash. Neurological: Negative for dizziness, weakness and headaches. Hematological: Does not bruise/bleed easily. Psychiatric/Behavioral: Negative for sleep disturbance. The patient is not nervous/anxious. PmHx:  has a past medical history of HTN (hypertension), Hyperlipidemia, Hypertension, and Renal insufficiency. Social History:  reports that she has never smoked. She has never used smokeless tobacco. She reports that she does not drink alcohol and does not use drugs.     ALLERGIES   No Known Allergies    CURRENT MEDICATIONS      Current Outpatient Medications:     diclofenac sodium (VOLTAREN) 1 % GEL, Apply 2 g topically 2 times daily, Disp: 150 g, Rfl: 2    hydroxychloroquine (PLAQUENIL) 200 MG tablet, Take 1.5 tablets by mouth daily, Disp: 45 tablet, Rfl: 3    alendronate (FOSAMAX) 70 MG tablet, take 1 tablet by mouth every week, Disp: , Rfl: 1    metoprolol tartrate (LOPRESSOR) 25 MG tablet, Take 12.5 mg by mouth 2 times daily, Disp: , Rfl:     chlorthalidone (HYGROTON) 25 MG tablet, Take 25 mg by mouth daily, Disp: , Rfl:     Misc Natural Products (OSTEO BI-FLEX JOINT SHIELD PO), Take by mouth daily, Disp: , Rfl:     Lactobacillus (ACIDOPHILUS PO), Take by mouth daily, Disp: , Rfl:     atorvastatin (LIPITOR) 20 MG tablet, Take 20 mg by mouth daily, Disp: , Rfl:     Multiple Vitamin (MULTI-VITAMIN PO), Take  by mouth daily. , Disp: , Rfl:     Multiple Vitamins-Minerals (VISION FORMULA PO), Take  by mouth daily. , Disp: , Rfl:     CALCIUM CITRATE, by Does not apply route 2 times daily , Disp: , Rfl:     gabapentin (NEURONTIN) 100 MG capsule, Take 100 mg by mouth 3 times daily. (Patient not taking: Reported on 12/22/2021), Disp: , Rfl:     acetaminophen (TYLENOL) 100 MG/ML solution, Take 10 mg/kg by mouth every 4 hours as needed for Fever, Disp: , Rfl:     PHYSICAL EXAMINATION / OBJECTIVE     Objective:  BP (!) 144/94 (Site: Left Upper Arm, Position: Sitting, Cuff Size: Medium Adult)   Pulse 76   Ht 5' 7.01\" (1.702 m)   Wt 140 lb 9.6 oz (63.8 kg)   SpO2 98%   BMI 22.02 kg/m²     Physical Exam    General Appearance: General appearance:  AAO x 3 ,  well-developed and well nourished  Head: NCAT  Eyes: No abnormalities. ,  Sclera non-icteric,   Ears / Nose:  normal  appearance  ears and nose. No active drainage from nose. Mouth:  MMM, ears w/o deformities  Neck: No jugular venous distention, appears symmetric, good ROM  Lymph: no cervical adenopathy   Pulmonary/Chest: CTA bilateral ,  symmetric chest expansion. Cardiovascular: Normal S1 and S2, NO murmur, rub, gallop  : Deferred   Abd/GI: Deferred   Neurologic: Speech normal, no facial droop,  Skin: NO rash on exposed skin.       Upper extremities: ROM --- intact bilateral upper extremities. Msk Strength 5/5 in bilateral shoulders, elbow flex/ext. Hands: +b/l  heberden nodes,  PIP joints bilateral hands with hyperextension. CMC squaring bilat. W/ overlying warmth. Tender left 3rd DIP. + triggering left thumb.      Lower ext: - No swelling, No swelling          LABS      Lab Results   Component Value Date    WBC 7.4 03/20/2019    HGB 14.2 03/20/2019    MCV 94.4 03/20/2019    MCHC 32.4 03/20/2019     03/20/2019    LYMPHSABS 2.7 03/20/2019    EOSABS 0.1 03/20/2019    BASOSABS 0.0 03/20/2019         Chemistry        Component Value Date/Time     04/20/2020 0914    K 3.6 04/20/2020 0914     04/20/2020 0914    CO2 27 04/20/2020 0914    BUN 14 04/20/2020 0914    CREATININE 1.1 04/20/2020 0914        Component Value Date/Time    CALCIUM 9.9 04/20/2020 0914    ALKPHOS 57 04/20/2020 0914    AST 23 04/20/2020 0914    ALT 21 04/20/2020 0914    BILITOT 0.6 04/20/2020 0914            Lab Results   Component Value Date    SEDRATE 8 03/20/2019    CRP 0.12 03/20/2019       Lab Results   Component Value Date    VITD25 47 02/13/2017       Lab Results   Component Value Date    ANASCRN None Detected 03/20/2019     Lab Results   Component Value Date    CCPAB 3 03/20/2019     Lab Results   Component Value Date    RF < 10 03/20/2019       RADIOLOGY / PROCEDURES:                 Electronically signed by Mercedes Pedraza DO on 12/22/21 at 10:37 AM EST  Please contact the office if you have any questions or change of symptoms.

## 2022-03-07 DIAGNOSIS — M19.90 INFLAMMATORY ARTHRITIS: ICD-10-CM

## 2022-03-07 RX ORDER — HYDROXYCHLOROQUINE SULFATE 200 MG/1
300 TABLET, FILM COATED ORAL DAILY
Qty: 45 TABLET | Refills: 3 | Status: SHIPPED | OUTPATIENT
Start: 2022-03-07 | End: 2022-06-30

## 2022-03-07 NOTE — TELEPHONE ENCOUNTER
Vicki Carrasquillo called requesting a refill on the following medications:  Requested Prescriptions     Pending Prescriptions Disp Refills    hydroxychloroquine (PLAQUENIL) 200 MG tablet 45 tablet 3     Sig: Take 1.5 tablets by mouth daily     [facility] [id number]   . pv      Date of last visit:   Date of next visit (if applicable): Visit date not found

## 2022-06-30 DIAGNOSIS — M19.90 INFLAMMATORY ARTHRITIS: ICD-10-CM

## 2022-06-30 RX ORDER — HYDROXYCHLOROQUINE SULFATE 200 MG/1
TABLET, FILM COATED ORAL
Qty: 45 TABLET | Refills: 3 | Status: SHIPPED | OUTPATIENT
Start: 2022-06-30 | End: 2022-10-17

## 2022-10-15 DIAGNOSIS — M19.90 INFLAMMATORY ARTHRITIS: ICD-10-CM

## 2022-10-17 RX ORDER — HYDROXYCHLOROQUINE SULFATE 200 MG/1
TABLET, FILM COATED ORAL
Qty: 45 TABLET | Refills: 3 | Status: SHIPPED | OUTPATIENT
Start: 2022-10-17

## 2022-12-28 ENCOUNTER — OFFICE VISIT (OUTPATIENT)
Dept: RHEUMATOLOGY | Age: 72
End: 2022-12-28
Payer: MEDICARE

## 2022-12-28 VITALS
BODY MASS INDEX: 22.08 KG/M2 | WEIGHT: 140.65 LBS | HEART RATE: 71 BPM | DIASTOLIC BLOOD PRESSURE: 82 MMHG | OXYGEN SATURATION: 99 % | SYSTOLIC BLOOD PRESSURE: 142 MMHG | HEIGHT: 67 IN

## 2022-12-28 DIAGNOSIS — M15.9 PRIMARY OSTEOARTHRITIS INVOLVING MULTIPLE JOINTS: ICD-10-CM

## 2022-12-28 DIAGNOSIS — M19.90 INFLAMMATORY ARTHRITIS: Primary | ICD-10-CM

## 2022-12-28 DIAGNOSIS — Z51.81 MEDICATION MONITORING ENCOUNTER: ICD-10-CM

## 2022-12-28 DIAGNOSIS — M85.80 OSTEOPENIA, UNSPECIFIED LOCATION: ICD-10-CM

## 2022-12-28 PROCEDURE — G8420 CALC BMI NORM PARAMETERS: HCPCS | Performed by: INTERNAL MEDICINE

## 2022-12-28 PROCEDURE — 3017F COLORECTAL CA SCREEN DOC REV: CPT | Performed by: INTERNAL MEDICINE

## 2022-12-28 PROCEDURE — 1090F PRES/ABSN URINE INCON ASSESS: CPT | Performed by: INTERNAL MEDICINE

## 2022-12-28 PROCEDURE — 1123F ACP DISCUSS/DSCN MKR DOCD: CPT | Performed by: INTERNAL MEDICINE

## 2022-12-28 PROCEDURE — 3078F DIAST BP <80 MM HG: CPT | Performed by: INTERNAL MEDICINE

## 2022-12-28 PROCEDURE — G8399 PT W/DXA RESULTS DOCUMENT: HCPCS | Performed by: INTERNAL MEDICINE

## 2022-12-28 PROCEDURE — G8484 FLU IMMUNIZE NO ADMIN: HCPCS | Performed by: INTERNAL MEDICINE

## 2022-12-28 PROCEDURE — G8427 DOCREV CUR MEDS BY ELIG CLIN: HCPCS | Performed by: INTERNAL MEDICINE

## 2022-12-28 PROCEDURE — 99213 OFFICE O/P EST LOW 20 MIN: CPT | Performed by: INTERNAL MEDICINE

## 2022-12-28 PROCEDURE — 3074F SYST BP LT 130 MM HG: CPT | Performed by: INTERNAL MEDICINE

## 2022-12-28 PROCEDURE — 1036F TOBACCO NON-USER: CPT | Performed by: INTERNAL MEDICINE

## 2022-12-28 RX ORDER — LISINOPRIL 10 MG/1
TABLET ORAL
COMMUNITY
Start: 2022-11-03

## 2022-12-28 ASSESSMENT — ENCOUNTER SYMPTOMS
RESPIRATORY NEGATIVE: 1
EYES NEGATIVE: 1
GASTROINTESTINAL NEGATIVE: 1

## 2022-12-28 NOTE — PROGRESS NOTES
[facility] RHEUMATOLOGY FOLLOW UP NOTE     Date Of Service: 12/28/2022  Provider: Phill Schirmer, DO ,   PCP: NU Chang - GUILLERMINA   Name: Rohith Denise   MRN: [id number]    ASSESSMENT/PLAN:     1. Inflammatory arthritis. -- undiff.                - hands, Left posterior shoulder, knees x 1 year, back pain x several year. Denies joint redness/warmth + swelling along the [facility] joint. XR hands with subluxation of 2nd MCP noted. Joint exam with tender MCPs, PIPs, DIPs and CMC joint bilateral elbows. , + heberden nodes bilat. - plaquenil 300mg daily ( June 2019)    - discussed voltera gel. 2. Primary osteoarthritis involving multiple joints  - evaluation of secondary causes. --- crystalline, inflammatory , vs other. ? Inflammatory osteoarthritis                - continue tylenol               - Rx for volteran gel provieded. 3. Trigger finger. - left thumb, right ring finger    - declined intervention at this time      4. Chronic low back pain --intermittent, localized. hx of back pain prior surgery in 2004, intemrittent low back pain mainly with prolonged standing. No radicular sx's, paresthesias or red flag sx's               - avoiding oral/systemic NSAIDs b/c of CKD               - continue tylenol     5. Medication monitoring  - Plaquenil eye examination  -- leeann robles --- request eye exam results from Dr. Rose Marie Perez  - need repeat cbc, cmp for plaquenil monitoring              No follow-ups on file. New Prescriptions    No medications on file     History of Present Illness (HPI)     Chief Complaint   Patient presents with    Follow-up     1 yr f/u, Inflammatory arthritis, Primary osteoarthritis involving multiple joints    Pt states pain 1/10 - R ring and middle finger, L Middle finger, Lower back.          Rohith Denise  is a(n)64 y.o. female with a hx of CKD stage 3, Hypertension, hyperlipidemia, chronic low back pain, osteoarthritis hand here for the f/u evaluation o fhe of osteoarthritis/inflammatory arthritis, medication monitoring      Triggering of the Left trigger thumb, Right 3-4 finger     Arthralgia intermittent localized hands, lower back   Pain up to 2.5/10 over the past week. Aggravating: knee - prolonged weight bearing , back. : wt bearing, hands : increased   Alleviating: tylenol,  Lower back/legs - non-wt bearing, sitting. Denies joint swelling, redness,warmth   Am stiffness of the back lasting ~5 minutes. Relief w/ movement & hot shower. REVIEW OF SYSTEMS: (ROS)    Review of Systems   Constitutional: Negative. HENT: Negative. Eyes: Negative. Respiratory: Negative. Cardiovascular: Negative. Gastrointestinal: Negative. Endocrine: Negative. Genitourinary: Negative. Skin: Negative. Neurological: Negative. Hematological: Negative. PmHx:  has a past medical history of HTN (hypertension), Hyperlipidemia, Hypertension, and Renal insufficiency. Social History:  reports that she has never smoked. She has never used smokeless tobacco. She reports that she does not drink alcohol and does not use drugs.     ALLERGIES   No Known Allergies    CURRENT MEDICATIONS      Current Outpatient Medications:     lisinopril (PRINIVIL;ZESTRIL) 10 MG tablet, take 1 tablet by mouth twice a day, Disp: , Rfl:     hydroxychloroquine (PLAQUENIL) 200 MG tablet, take 1 AND 1/2 tablets by mouth once daily, Disp: 45 tablet, Rfl: 3    alendronate (FOSAMAX) 70 MG tablet, take 1 tablet by mouth every week, Disp: , Rfl: 1    metoprolol tartrate (LOPRESSOR) 25 MG tablet, Take 12.5 mg by mouth 2 times daily, Disp: , Rfl:     Misc Natural Products (OSTEO BI-FLEX JOINT SHIELD PO), Take by mouth daily, Disp: , Rfl:     acetaminophen (TYLENOL) 100 MG/ML solution, Take 10 mg/kg by mouth every 4 hours as needed for Fever, Disp: , Rfl:     atorvastatin (LIPITOR) 20 MG tablet, Take 20 mg by mouth daily, Disp: , Rfl:     Multiple Vitamin (MULTI-VITAMIN PO), Take  by mouth daily. , Disp: , Rfl:     Multiple Vitamins-Minerals (VISION FORMULA PO), Take  by mouth daily. , Disp: , Rfl:     CALCIUM CITRATE, by Does not apply route 2 times daily , Disp: , Rfl:     diclofenac sodium (VOLTAREN) 1 % GEL, Apply 2 g topically 2 times daily (Patient not taking: Reported on 12/28/2022), Disp: 150 g, Rfl: 2    gabapentin (NEURONTIN) 100 MG capsule, Take 100 mg by mouth 3 times daily. (Patient not taking: No sig reported), Disp: , Rfl:     chlorthalidone (HYGROTON) 25 MG tablet, Take 25 mg by mouth daily, Disp: , Rfl:     Lactobacillus (ACIDOPHILUS PO), Take by mouth daily, Disp: , Rfl:     PHYSICAL EXAMINATION / OBJECTIVE     Objective:  BP (!) 172/86 (Site: Left Upper Arm, Position: Sitting, Cuff Size: Large Adult)   Pulse 71   Ht 5' 7.01\" (1.702 m)   Wt 140 lb 10.5 oz (63.8 kg)   SpO2 99%   BMI 22.02 kg/m²     Physical Exam  Vitals reviewed. Constitutional:       Appearance: Normal appearance. HENT:      Head: Normocephalic. Mouth/Throat:      Mouth: Mucous membranes are moist.   Eyes:      Conjunctiva/sclera: Conjunctivae normal.   Cardiovascular:      Rate and Rhythm: Normal rate. Heart sounds: No murmur heard. Pulmonary:      Effort: Pulmonary effort is normal.      Breath sounds: Normal breath sounds. Musculoskeletal:         General: No swelling or tenderness. Normal range of motion. Neurological:      General: No focal deficit present. Mental Status: She is alert and oriented to person, place, and time. Upper extremities:  Hands: +b/l  heberden nodes,  PIP joints bilateral hands with hyperextension. CMC squaring bilat. W/ overlying warmth. Tender left 3rd DIP. + triggering left thumb right 4th finger.       Lower ext: - No swelling, No swelling          LABS      Lab Results   Component Value Date    WBC 7.4 03/20/2019    HGB 14.2 03/20/2019    MCV 94.4 03/20/2019    MCHC 32.4 03/20/2019     03/20/2019    LYMPHSABS 2.7 03/20/2019    EOSABS 0.1 03/20/2019    BASOSABS 0.0 03/20/2019         Chemistry        Component Value Date/Time     04/20/2020 0914    K 3.6 04/20/2020 0914     04/20/2020 0914    CO2 27 04/20/2020 0914    BUN 14 04/20/2020 0914    CREATININE 1.1 04/20/2020 0914        Component Value Date/Time    CALCIUM 9.9 04/20/2020 0914    ALKPHOS 57 04/20/2020 0914    AST 23 04/20/2020 0914    ALT 21 04/20/2020 0914    BILITOT 0.6 04/20/2020 0914            Lab Results   Component Value Date    SEDRATE 8 03/20/2019    CRP 0.12 03/20/2019       Lab Results   Component Value Date/Time    VITD25 47 02/13/2017 12:57 PM       RADIOLOGY / PROCEDURES:                 Electronically signed by Isabela Ruiz DO on 12/22/21 at 10:37 AM EST  Please contact the office if you have any questions or change of symptoms.

## 2023-01-23 ENCOUNTER — TELEPHONE (OUTPATIENT)
Dept: RHEUMATOLOGY | Age: 73
End: 2023-01-23

## 2023-01-23 DIAGNOSIS — M19.90 INFLAMMATORY ARTHRITIS: Primary | ICD-10-CM

## 2023-01-23 DIAGNOSIS — M06.00 SERONEGATIVE RHEUMATOID ARTHRITIS (HCC): ICD-10-CM

## 2023-01-27 RX ORDER — HYDROXYCHLOROQUINE SULFATE 200 MG/1
TABLET, FILM COATED ORAL
Qty: 45 TABLET | Refills: 3 | Status: SHIPPED | OUTPATIENT
Start: 2023-01-27

## 2023-05-27 DIAGNOSIS — M06.00 SERONEGATIVE RHEUMATOID ARTHRITIS (HCC): ICD-10-CM

## 2023-05-27 DIAGNOSIS — M19.90 INFLAMMATORY ARTHRITIS: ICD-10-CM

## 2023-05-30 RX ORDER — HYDROXYCHLOROQUINE SULFATE 200 MG/1
TABLET, FILM COATED ORAL
Qty: 45 TABLET | Refills: 3 | Status: SHIPPED | OUTPATIENT
Start: 2023-05-30

## 2023-08-07 ENCOUNTER — HOSPITAL ENCOUNTER (EMERGENCY)
Age: 73
Discharge: HOME | End: 2023-08-07
Attending: EMERGENCY MEDICINE
Payer: MEDICARE

## 2023-08-07 VITALS
WEIGHT: 131 LBS | TEMPERATURE: 98 F | OXYGEN SATURATION: 98 % | SYSTOLIC BLOOD PRESSURE: 174 MMHG | BODY MASS INDEX: 20.56 KG/M2 | HEART RATE: 68 BPM | RESPIRATION RATE: 18 BRPM | DIASTOLIC BLOOD PRESSURE: 93 MMHG | HEIGHT: 67 IN

## 2023-08-07 DIAGNOSIS — I10 EPISODE OF HYPERTENSION: Primary | ICD-10-CM

## 2023-08-07 PROCEDURE — 6370000000 HC RX 637 (ALT 250 FOR IP): Performed by: EMERGENCY MEDICINE

## 2023-08-07 PROCEDURE — 99283 EMERGENCY DEPT VISIT LOW MDM: CPT

## 2023-08-07 RX ORDER — LISINOPRIL 10 MG/1
10 TABLET ORAL DAILY
Status: DISCONTINUED | OUTPATIENT
Start: 2023-08-07 | End: 2023-08-07

## 2023-08-07 RX ORDER — METOPROLOL TARTRATE 50 MG/1
25 TABLET, FILM COATED ORAL ONCE
Status: COMPLETED | OUTPATIENT
Start: 2023-08-07 | End: 2023-08-07

## 2023-08-07 RX ADMIN — METOPROLOL TARTRATE 25 MG: 50 TABLET, FILM COATED ORAL at 06:38

## 2023-08-07 ASSESSMENT — LIFESTYLE VARIABLES
HOW MANY STANDARD DRINKS CONTAINING ALCOHOL DO YOU HAVE ON A TYPICAL DAY: PATIENT DOES NOT DRINK
HOW OFTEN DO YOU HAVE A DRINK CONTAINING ALCOHOL: NEVER

## 2023-08-07 NOTE — DISCHARGE INSTRUCTIONS
Continue home medication. It is okay to take your lisinopril this morning when you get home. Rest and monitor at home. Call your primary care doctor today to discuss further plan, high blood pressure treatment and repeat evaluation.

## 2023-08-07 NOTE — ED NOTES
Discharge teaching and instructions for condition explained to patient. AVS reviewed. Patient voiced understanding regarding prescriptions, follow up appointments and care of self at home. Pt discharged to home in stable condition per self with .        Geeta Otero RN  08/07/23 [id number]

## 2023-08-07 NOTE — ED NOTES
Presents c/o HTN since around Thursday when she had an injection in her right side back/hip. She states it was noted to be high at that time but she was asymptomatic. D/c home and has continued to monitor it. She states she has not taken her bp meds this morning yet. Denies cp, headache, sob or blurred vision. Triage exam room 7 with spouse at side.       Saji Shankar RN  08/07/23 [id number]

## 2023-09-28 DIAGNOSIS — M19.90 INFLAMMATORY ARTHRITIS: ICD-10-CM

## 2023-09-28 DIAGNOSIS — M06.00 SERONEGATIVE RHEUMATOID ARTHRITIS (HCC): ICD-10-CM

## 2023-09-28 RX ORDER — HYDROXYCHLOROQUINE SULFATE 200 MG/1
TABLET, FILM COATED ORAL
Qty: 45 TABLET | Refills: 3 | Status: SHIPPED | OUTPATIENT
Start: 2023-09-28

## 2023-12-28 ENCOUNTER — NURSE ONLY (OUTPATIENT)
Dept: LAB | Age: 73
End: 2023-12-28

## 2023-12-28 ENCOUNTER — OFFICE VISIT (OUTPATIENT)
Dept: RHEUMATOLOGY | Age: 73
End: 2023-12-28
Payer: MEDICARE

## 2023-12-28 VITALS
DIASTOLIC BLOOD PRESSURE: 78 MMHG | OXYGEN SATURATION: 98 % | HEIGHT: 67 IN | WEIGHT: 131 LBS | BODY MASS INDEX: 20.56 KG/M2 | HEART RATE: 76 BPM | SYSTOLIC BLOOD PRESSURE: 146 MMHG

## 2023-12-28 DIAGNOSIS — M06.00 SERONEGATIVE RHEUMATOID ARTHRITIS (HCC): Primary | ICD-10-CM

## 2023-12-28 DIAGNOSIS — Z51.81 MEDICATION MONITORING ENCOUNTER: ICD-10-CM

## 2023-12-28 DIAGNOSIS — M85.80 OSTEOPENIA, UNSPECIFIED LOCATION: ICD-10-CM

## 2023-12-28 DIAGNOSIS — M15.9 PRIMARY OSTEOARTHRITIS INVOLVING MULTIPLE JOINTS: ICD-10-CM

## 2023-12-28 LAB
ALBUMIN SERPL BCG-MCNC: 4.2 G/DL (ref 3.5–5.1)
ALP SERPL-CCNC: 58 U/L (ref 38–126)
ALT SERPL W/O P-5'-P-CCNC: 22 U/L (ref 11–66)
ANION GAP SERPL CALC-SCNC: 11 MEQ/L (ref 8–16)
AST SERPL-CCNC: 24 U/L (ref 5–40)
BASOPHILS ABSOLUTE: 0 THOU/MM3 (ref 0–0.1)
BASOPHILS NFR BLD AUTO: 0.7 %
BILIRUB SERPL-MCNC: 0.4 MG/DL (ref 0.3–1.2)
BUN SERPL-MCNC: 15 MG/DL (ref 7–22)
CALCIUM SERPL-MCNC: 9.7 MG/DL (ref 8.5–10.5)
CHLORIDE SERPL-SCNC: 106 MEQ/L (ref 98–111)
CO2 SERPL-SCNC: 28 MEQ/L (ref 23–33)
CREAT SERPL-MCNC: 1 MG/DL (ref 0.4–1.2)
DEPRECATED RDW RBC AUTO: 52.5 FL (ref 35–45)
EOSINOPHIL NFR BLD AUTO: 1.5 %
EOSINOPHILS ABSOLUTE: 0.1 THOU/MM3 (ref 0–0.4)
ERYTHROCYTE [DISTWIDTH] IN BLOOD BY AUTOMATED COUNT: 14.4 % (ref 11.5–14.5)
GFR SERPL CREATININE-BSD FRML MDRD: 59 ML/MIN/1.73M2
GLUCOSE SERPL-MCNC: 71 MG/DL (ref 70–108)
HCT VFR BLD AUTO: 42.7 % (ref 37–47)
HGB BLD-MCNC: 13.4 GM/DL (ref 12–16)
IMM GRANULOCYTES # BLD AUTO: 0.01 THOU/MM3 (ref 0–0.07)
IMM GRANULOCYTES NFR BLD AUTO: 0.2 %
LYMPHOCYTES ABSOLUTE: 2.1 THOU/MM3 (ref 1–4.8)
LYMPHOCYTES NFR BLD AUTO: 36.9 %
MCH RBC QN AUTO: 30.9 PG (ref 26–33)
MCHC RBC AUTO-ENTMCNC: 31.4 GM/DL (ref 32.2–35.5)
MCV RBC AUTO: 98.4 FL (ref 81–99)
MONOCYTES ABSOLUTE: 0.5 THOU/MM3 (ref 0.4–1.3)
MONOCYTES NFR BLD AUTO: 9.1 %
NEUTROPHILS NFR BLD AUTO: 51.6 %
NRBC BLD AUTO-RTO: 0 /100 WBC
PLATELET # BLD AUTO: 248 THOU/MM3 (ref 130–400)
PMV BLD AUTO: 10.7 FL (ref 9.4–12.4)
POTASSIUM SERPL-SCNC: 4 MEQ/L (ref 3.5–5.2)
PROT SERPL-MCNC: 6.7 G/DL (ref 6.1–8)
RBC # BLD AUTO: 4.34 MILL/MM3 (ref 4.2–5.4)
SEGMENTED NEUTROPHILS ABSOLUTE COUNT: 3 THOU/MM3 (ref 1.8–7.7)
SODIUM SERPL-SCNC: 145 MEQ/L (ref 135–145)
WBC # BLD AUTO: 5.8 THOU/MM3 (ref 4.8–10.8)

## 2023-12-28 PROCEDURE — G8399 PT W/DXA RESULTS DOCUMENT: HCPCS | Performed by: INTERNAL MEDICINE

## 2023-12-28 PROCEDURE — 1090F PRES/ABSN URINE INCON ASSESS: CPT | Performed by: INTERNAL MEDICINE

## 2023-12-28 PROCEDURE — 3078F DIAST BP <80 MM HG: CPT | Performed by: INTERNAL MEDICINE

## 2023-12-28 PROCEDURE — 3077F SYST BP >= 140 MM HG: CPT | Performed by: INTERNAL MEDICINE

## 2023-12-28 PROCEDURE — G8427 DOCREV CUR MEDS BY ELIG CLIN: HCPCS | Performed by: INTERNAL MEDICINE

## 2023-12-28 PROCEDURE — 99214 OFFICE O/P EST MOD 30 MIN: CPT | Performed by: INTERNAL MEDICINE

## 2023-12-28 PROCEDURE — 1123F ACP DISCUSS/DSCN MKR DOCD: CPT | Performed by: INTERNAL MEDICINE

## 2023-12-28 PROCEDURE — 3017F COLORECTAL CA SCREEN DOC REV: CPT | Performed by: INTERNAL MEDICINE

## 2023-12-28 PROCEDURE — 1036F TOBACCO NON-USER: CPT | Performed by: INTERNAL MEDICINE

## 2023-12-28 PROCEDURE — G8484 FLU IMMUNIZE NO ADMIN: HCPCS | Performed by: INTERNAL MEDICINE

## 2023-12-28 PROCEDURE — G8420 CALC BMI NORM PARAMETERS: HCPCS | Performed by: INTERNAL MEDICINE

## 2023-12-28 NOTE — PROGRESS NOTES
[facility] RHEUMATOLOGY FOLLOW UP NOTE     Date Of Service: 12/28/2023  Provider: Bao Monae DO , DO  PCP: NU Leiva - CNP   Name: Brittaney Carvalho   MRN: [id number]    Assessment   Plan     1. Inflammatory arthritis. -- undiff.                - hands, Left posterior shoulder, knees x 1 year, back pain x several year. Denies joint redness/warmth + swelling along the [facility] joint. XR hands with subluxation of 2nd MCP noted. Joint exam with tender MCPs, PIPs, DIPs and CMC joint bilateral elbows. , + heberden nodes bilat. - plaquenil 300mg daily ( June 2019)    - discussed voltera gel. 2. Primary osteoarthritis involving multiple joints  - evaluation of secondary causes. --- crystalline, inflammatory , vs other. ? Inflammatory osteoarthritis    - prior tx: volteran gel (no relief), avoiding NSAIDs b/c CKD               - continue tylenol                                3. Trigger finger. - left thumb, right ring finger - resolved. - declined intervention at this time      4. Chronic low back pain --intermittent, localized. hx of back pain prior surgery in 2004, intemrittent low back pain mainly with prolonged standing. No radicular sx's, paresthesias or red flag sx's               - avoiding oral/systemic NSAIDs b/c of CKD               - continue tylenol   - request back and hip x-ray Dr. Alison Sousa office. 5. Medication monitoring  - Plaquenil eye examination  -- leeann robles --- request eye exam results from Dr. Rubin Ing  - need repeat cbc, cmp for plaquenil monitoring              No follow-ups on file.   New Prescriptions    No medications on file     History of Present Illness (HPI)     Chief Complaint   Patient presents with    Follow-up     1 YEAR F/U Inflammatory arthritis        Brittaney Carvalho  is a(n)73 y.o. female with a hx of CKD stage 3, Hypertension, hyperlipidemia, chronic low back pain, osteoarthritis hand here for the f/u evaluation o fhe of

## 2024-01-10 DIAGNOSIS — M06.00 SERONEGATIVE RHEUMATOID ARTHRITIS (HCC): ICD-10-CM

## 2024-01-10 DIAGNOSIS — M19.90 INFLAMMATORY ARTHRITIS: ICD-10-CM

## 2024-01-10 RX ORDER — HYDROXYCHLOROQUINE SULFATE 200 MG/1
TABLET, FILM COATED ORAL
Qty: 45 TABLET | Refills: 3 | Status: SHIPPED | OUTPATIENT
Start: 2024-01-10

## 2024-02-19 DIAGNOSIS — M19.90 INFLAMMATORY ARTHRITIS: ICD-10-CM

## 2024-02-19 DIAGNOSIS — M06.00 SERONEGATIVE RHEUMATOID ARTHRITIS (HCC): ICD-10-CM

## 2024-02-19 RX ORDER — HYDROXYCHLOROQUINE SULFATE 200 MG/1
300 TABLET, FILM COATED ORAL DAILY
Qty: 45 TABLET | Refills: 3 | Status: SHIPPED | OUTPATIENT
Start: 2024-02-19

## 2024-02-19 NOTE — TELEPHONE ENCOUNTER
Vicki Carrasquillo called requesting a refill on the following medications:  Requested Prescriptions     Pending Prescriptions Disp Refills    hydroxychloroquine (PLAQUENIL) 200 MG tablet 45 tablet 3     Sig: Take 1.5 tablets by mouth daily     Pharmacy verified:  [facility]      Date of last visit: 12-28-23  Date of next visit (if applicable):1/2/25

## 2024-06-11 DIAGNOSIS — M19.90 INFLAMMATORY ARTHRITIS: ICD-10-CM

## 2024-06-11 DIAGNOSIS — M06.00 SERONEGATIVE RHEUMATOID ARTHRITIS (HCC): ICD-10-CM

## 2024-06-11 RX ORDER — HYDROXYCHLOROQUINE SULFATE 200 MG/1
TABLET, FILM COATED ORAL
Qty: 45 TABLET | Refills: 3 | Status: SHIPPED | OUTPATIENT
Start: 2024-06-11

## 2024-07-20 ENCOUNTER — APPOINTMENT (OUTPATIENT)
Dept: GENERAL RADIOLOGY | Age: 74
End: 2024-07-20
Payer: MEDICARE

## 2024-07-20 ENCOUNTER — HOSPITAL ENCOUNTER (EMERGENCY)
Age: 74
Discharge: HOME | End: 2024-07-20
Attending: EMERGENCY MEDICINE
Payer: MEDICARE

## 2024-07-20 VITALS
WEIGHT: 128 LBS | HEART RATE: 87 BPM | BODY MASS INDEX: 20.09 KG/M2 | RESPIRATION RATE: 18 BRPM | DIASTOLIC BLOOD PRESSURE: 99 MMHG | TEMPERATURE: 98 F | OXYGEN SATURATION: 98 % | SYSTOLIC BLOOD PRESSURE: 164 MMHG | HEIGHT: 67 IN

## 2024-07-20 DIAGNOSIS — S86.912A STRAIN OF LEFT KNEE, INITIAL ENCOUNTER: Primary | ICD-10-CM

## 2024-07-20 PROCEDURE — 73564 X-RAY EXAM KNEE 4 OR MORE: CPT

## 2024-07-20 PROCEDURE — 99283 EMERGENCY DEPT VISIT LOW MDM: CPT

## 2024-07-20 RX ORDER — CELECOXIB 100 MG/1
100 CAPSULE ORAL 2 TIMES DAILY
COMMUNITY

## 2024-07-20 ASSESSMENT — PAIN - FUNCTIONAL ASSESSMENT: PAIN_FUNCTIONAL_ASSESSMENT: 0-10

## 2024-07-20 ASSESSMENT — PAIN SCALES - GENERAL: PAINLEVEL_OUTOF10: 6

## 2024-07-20 NOTE — ED PROVIDER NOTES
[facility]  [address] [place] [address]  Phone: [phone]  EMERGENCY DEPARTMENT ENCOUNTER      Pt Name: Vicki Carrasquillo  MRN: [id number]  Birthdate 1950  Date of evaluation: 7/20/2024  Provider: Tye Linder MD    CHIEF COMPLAINT       Chief Complaint   Patient presents with    Knee Pain     Left knee pain, got up out of bed, knee gave out and patient has had pain and can't bear weight since.         HISTORY OF PRESENT ILLNESS      Vicki Carrasquillo is a 73 y.o. female who presents to the emergency department with above-noted complaint.  Patient been doing fine.  Does have little bit of left knee pain prior to this morning and have a history of right knee pain.  She subsequently got up and felt some pain discomfort in her left knee felt like a slight pop.  To decrease weightbearing since then.  Points to the left medial knee and patella area        REVIEW OF SYSTEMS     Positive for knee pain without other trauma   Review of Systems  All systems negative except as marked.     PAST MEDICAL HISTORY     Past Medical History:   Diagnosis Date    HTN (hypertension)     Hyperlipidemia     Hypertension     Renal insufficiency          SURGICAL HISTORY       Past Surgical History:   Procedure Laterality Date    BACK SURGERY  2004    BRAIN SURGERY  02/04/2021    meningioma         CURRENT MEDICATIONS       Previous Medications    ACETAMINOPHEN (TYLENOL) 100 MG/ML SOLUTION    Take 10 mg/kg by mouth every 4 hours as needed for Fever    ALENDRONATE (FOSAMAX) 70 MG TABLET    take 1 tablet by mouth every week    ATORVASTATIN (LIPITOR) 20 MG TABLET    Take 1 tablet by mouth daily    CALCIUM CITRATE    by Does not apply route 2 times daily     CELECOXIB (CELEBREX) 100 MG CAPSULE    Take 1 capsule by mouth 2 times daily    HYDROXYCHLOROQUINE (PLAQUENIL) 200 MG TABLET    TAKE 1 & 1/2 (ONE & ONE-HALF) TABLETS BY MOUTH ONCE DAILY    LISINOPRIL (PRINIVIL;ZESTRIL) 10 MG TABLET    take 1 tablet by          LABS:  Labs Reviewed - No data to display    All other labs were within normal range or not returned as of this dictation.      MIPS    Not applicable      EMERGENCY DEPARTMENT COURSE and DIFFERENTIAL DIAGNOSIS/MDM:   Isolated left knee pain and discomfort.  Checking plain films to rule out occult fracture or other injuries.  Counseled regards to care and treatment.  Try and immobilizer for now they have crutches at home.      1)  Number and Complexity of Problems  Problem List This Visit: Knee pain  Problem List Items Addressed This Visit    None  Visit Diagnoses       Strain of left knee, initial encounter    -  Primary            Differential Diagnosis: Knee sprain, patella fracture, quad rupture,      2)  Data Reviewed    Imaging that is independently reviewed with the associated radiologist report, not interpreted by me are:   X-ray left knee    Imaging that is independently reviewed and interpreted by me as:  Not applicable        See more data below for the lab and radiology tests and orders.    3)  Treatment and Disposition    Shared Decision Making:  Not applicable    Decision Rules/Scores utilized:  Not applicable       FINAL  REASSESSMENT     5:54 AM       X-rays negative    PROCEDURES:   none    Procedures     FINAL IMPRESSION      1. Strain of left knee, initial encounter          DISPOSITION/PLAN     DISPOSITION Decision To Discharge 07/20/2024 05:53:52 AM      PATIENT REFERRED TO:  Aide Rodriguez, APRN - CNP  [address] [facility]  [address]  [facility] [id number] [phone]          [facility]  [address]  [facility] [address] [phone]  Call in 2 days  Follow up from ER condition      DISCHARGE MEDICATIONS:  New Prescriptions    No medications on file       (Please note that portions of this note were completed with a voice recognition program.  Efforts were made to edit the dictations but occasionally words are mis-transcribed.)    Tye Linder MD

## 2024-07-20 NOTE — DISCHARGE INSTR - COC
Continuity of Care Form    Patient Name: Vicki Carrasquillo   :  1950  MRN:  [id number]    Admit date:  2024  Discharge date:  ***    Code Status Order: No Order   Advance Directives:     Admitting Physician:  No admitting provider for patient encounter.  PCP: Aide Rodriguez, APRN - CNP    Discharging Nurse: ***  Discharging Hospital Unit/Room#: E1/E1  Discharging Unit Phone Number: ***    Emergency Contact:   Extended Emergency Contact Information  Primary Emergency Contact: Carlton Carrasquillo  Address: [address] [place] [address] [facility]  Home Phone: [phone]  Relation: Child  Secondary Emergency Contact: Cuauhtemoc Carrasquillo  Address: [address]           [place] [address] [facility]  Home Phone: [phone]  Relation: Spouse    Past Surgical History:  Past Surgical History:   Procedure Laterality Date    BACK SURGERY  2004    BRAIN SURGERY  2021    meningioma       Immunization History:   Immunization History   Administered Date(s) Administered    COVID-19, PFIZER Bivalent, DO NOT Dilute, (age 12y+), IM, 30 mcg/0.3 mL 2022    COVID-19, PFIZER PURPLE top, DILUTE for use, (age 12 y+), 30mcg/0.3mL 2021, 2021, 2021    COVID-19, PFIZER, (- formula), (age 12y+), IM, 30mcg/0.3mL 10/02/2023       Active Problems:  Patient Active Problem List   Diagnosis Code    BPPV (benign paroxysmal positional vertigo) H81.10    ETD (eustachian tube dysfunction) H69.90    Dizziness and giddiness R42    Benign paroxysmal positional vertigo H81.10    Chronic rhinitis J31.0    Deviated nasal septum J34.2    Renal insufficiency N28.9    HTN (hypertension) I10    CKD (chronic kidney disease) stage 3, GFR 30-59 ml/min (HCC) N18.30    CKD (chronic kidney disease) stage 2, GFR 60-89 ml/min N18.2    CKD (chronic kidney disease), stage II N18.2    Primary osteoarthritis involving multiple joints M15.9    Inflammatory arthritis M19.90  Status/Restrictions: { CC Weight Bearin}  Other Medical Equipment (for information only, NOT a DME order):  {EQUIPMENT:879559217}  Other Treatments: ***    Patient's personal belongings (please select all that are sent with patient):  {CHP DME Belongings:527358382}    RN SIGNATURE:  {Esignature:464416250}    CASE MANAGEMENT/SOCIAL WORK SECTION    Inpatient Status Date: ***    Readmission Risk Assessment Score:  Readmission Risk              Risk of Unplanned Readmission:  0           Discharging to Facility/ Agency   Name:   Address:  Phone:  Fax:    Dialysis Facility (if applicable)   Name:  Address:  Dialysis Schedule:  Phone:  Fax:    / signature: {Esignature:353703559}    PHYSICIAN SECTION    Prognosis: {Prognosis:1158042458}    Condition at Discharge: { Patient Condition:078216810}    Rehab Potential (if transferring to Rehab): {Prognosis:8139459641}    Recommended Labs or Other Treatments After Discharge: ***    Physician Certification: I certify the above information and transfer of Vicki Carrasquillo  is necessary for the continuing treatment of the diagnosis listed and that she requires {Admit to Appropriate Level of Care:36899} for {GREATER/LESS:883550693} 30 days.     Update Admission H&P: {CHP DME Changes in HandP:655006224}    PHYSICIAN SIGNATURE:  {Esignature:439788236}

## 2024-07-20 NOTE — ED NOTES
Pt presents with c/o left knee pain that started when she got out of bed to use the bathroom and when she stood up she heard a pop to her left knee and fell backwards on the bed and has been having pain and unable to bear weight to the knee since, pt assisted to room with use of wheelchair,  at the bedside, left knee elevated and ice pack applied, pt tolerated well.

## 2024-07-20 NOTE — DISCHARGE INSTRUCTIONS
Use ice to decrease any swelling inflammation.  Wear immobilizer for comfort.  Use crutches do not weight-bear.  Call your doctor this week for close follow-up.  You may also go to [facility] starting at 7:30 in the morning during the week.

## 2024-08-27 ENCOUNTER — HOSPITAL ENCOUNTER (OUTPATIENT)
Dept: ULTRASOUND IMAGING | Age: 74
Discharge: HOME | End: 2024-08-27
Attending: ORTHOPAEDIC SURGERY
Payer: MEDICARE

## 2024-08-27 DIAGNOSIS — R60.9 SWELLING: ICD-10-CM

## 2024-08-27 PROCEDURE — 93971 EXTREMITY STUDY: CPT

## 2024-09-11 ENCOUNTER — HOSPITAL ENCOUNTER (OUTPATIENT)
Dept: MRI IMAGING | Age: 74
Discharge: HOME | End: 2024-09-11
Attending: ORTHOPAEDIC SURGERY
Payer: MEDICARE

## 2024-09-11 DIAGNOSIS — M25.562 LEFT KNEE PAIN, UNSPECIFIED CHRONICITY: ICD-10-CM

## 2024-09-11 PROCEDURE — 73721 MRI JNT OF LWR EXTRE W/O DYE: CPT

## 2024-09-17 ENCOUNTER — HOSPITAL ENCOUNTER (OUTPATIENT)
Dept: GENERAL RADIOLOGY | Age: 74
Discharge: HOME | End: 2024-09-17
Payer: MEDICARE

## 2024-09-17 ENCOUNTER — HOSPITAL ENCOUNTER (OUTPATIENT)
Age: 74
Discharge: HOME | End: 2024-09-17
Payer: MEDICARE

## 2024-09-17 DIAGNOSIS — M25.562 PAIN, JOINT, KNEE, LEFT: ICD-10-CM

## 2024-09-17 PROCEDURE — 73564 X-RAY EXAM KNEE 4 OR MORE: CPT

## 2024-09-24 ENCOUNTER — HOSPITAL ENCOUNTER (OUTPATIENT)
Age: 74
Discharge: HOME | End: 2024-09-24
Payer: MEDICARE

## 2024-09-24 ENCOUNTER — HOSPITAL ENCOUNTER (OUTPATIENT)
Dept: GENERAL RADIOLOGY | Age: 74
Discharge: HOME | End: 2024-09-24
Payer: MEDICARE

## 2024-09-24 DIAGNOSIS — Z01.811 PRE-OPERATIVE RESPIRATORY EXAMINATION: ICD-10-CM

## 2024-09-24 LAB
ANION GAP SERPL CALC-SCNC: 16 MEQ/L (ref 8–16)
BACTERIA URNS QL MICRO: ABNORMAL /HPF
BASOPHILS ABSOLUTE: 0.1 THOU/MM3 (ref 0–0.1)
BASOPHILS NFR BLD AUTO: 0.6 %
BILIRUB UR QL STRIP.AUTO: NEGATIVE
BUN SERPL-MCNC: 17 MG/DL (ref 7–22)
CALCIUM SERPL-MCNC: 9.9 MG/DL (ref 8.5–10.5)
CASTS #/AREA URNS LPF: ABNORMAL /LPF
CASTS 2: ABNORMAL /LPF
CHARACTER UR: CLEAR
CHLORIDE SERPL-SCNC: 104 MEQ/L (ref 98–111)
CO2 SERPL-SCNC: 21 MEQ/L (ref 23–33)
COLOR, UA: YELLOW
CREAT SERPL-MCNC: 1.1 MG/DL (ref 0.4–1.2)
CRYSTALS URNS MICRO: ABNORMAL
DEPRECATED RDW RBC AUTO: 50.8 FL (ref 35–45)
EKG ATRIAL RATE: 75 BPM
EKG P AXIS: 73 DEGREES
EKG P-R INTERVAL: 160 MS
EKG Q-T INTERVAL: 436 MS
EKG QRS DURATION: 80 MS
EKG QTC CALCULATION (BAZETT): 486 MS
EKG R AXIS: 62 DEGREES
EKG T AXIS: 37 DEGREES
EKG VENTRICULAR RATE: 75 BPM
EOSINOPHIL NFR BLD AUTO: 1.6 %
EOSINOPHILS ABSOLUTE: 0.2 THOU/MM3 (ref 0–0.4)
EPITHELIAL CELLS, UA: ABNORMAL /HPF
ERYTHROCYTE [DISTWIDTH] IN BLOOD BY AUTOMATED COUNT: 14 % (ref 11.5–14.5)
GFR SERPL CREATININE-BSD FRML MDRD: 52 ML/MIN/1.73M2
GLUCOSE SERPL-MCNC: 87 MG/DL (ref 70–108)
GLUCOSE UR QL STRIP.AUTO: NEGATIVE MG/DL
HCT VFR BLD AUTO: 43.1 % (ref 37–47)
HGB BLD-MCNC: 13.2 GM/DL (ref 12–16)
HGB UR QL STRIP.AUTO: NEGATIVE
IMM GRANULOCYTES # BLD AUTO: 0.03 THOU/MM3 (ref 0–0.07)
IMM GRANULOCYTES NFR BLD AUTO: 0.3 %
KETONES UR QL STRIP.AUTO: NEGATIVE
LYMPHOCYTES ABSOLUTE: 2.4 THOU/MM3 (ref 1–4.8)
LYMPHOCYTES NFR BLD AUTO: 26 %
MCH RBC QN AUTO: 30.1 PG (ref 26–33)
MCHC RBC AUTO-ENTMCNC: 30.6 GM/DL (ref 32.2–35.5)
MCV RBC AUTO: 98.2 FL (ref 81–99)
MISCELLANEOUS 2: ABNORMAL
MONOCYTES ABSOLUTE: 0.6 THOU/MM3 (ref 0.4–1.3)
MONOCYTES NFR BLD AUTO: 6.8 %
NEUTROPHILS ABSOLUTE: 6.1 THOU/MM3 (ref 1.8–7.7)
NEUTROPHILS NFR BLD AUTO: 64.7 %
NITRITE UR QL STRIP: NEGATIVE
NRBC BLD AUTO-RTO: 0 /100 WBC
PH UR STRIP.AUTO: 6.5 [PH] (ref 5–9)
PLATELET # BLD AUTO: 226 THOU/MM3 (ref 130–400)
PMV BLD AUTO: 11.9 FL (ref 9.4–12.4)
POTASSIUM SERPL-SCNC: 4.2 MEQ/L (ref 3.5–5.2)
PROT UR STRIP.AUTO-MCNC: NEGATIVE MG/DL
RBC # BLD AUTO: 4.39 MILL/MM3 (ref 4.2–5.4)
RBC URINE: ABNORMAL /HPF
RENAL EPI CELLS #/AREA URNS HPF: ABNORMAL /[HPF]
SODIUM SERPL-SCNC: 141 MEQ/L (ref 135–145)
SP GR UR REFRACT.AUTO: < 1.005 (ref 1–1.03)
UROBILINOGEN, URINE: 0.2 EU/DL (ref 0–1)
WBC # BLD AUTO: 9.4 THOU/MM3 (ref 4.8–10.8)
WBC #/AREA URNS HPF: ABNORMAL /HPF
WBC #/AREA URNS HPF: ABNORMAL /[HPF]
YEAST LIKE FUNGI URNS QL MICRO: ABNORMAL

## 2024-09-24 PROCEDURE — 71046 X-RAY EXAM CHEST 2 VIEWS: CPT

## 2024-09-24 PROCEDURE — 36415 COLL VENOUS BLD VENIPUNCTURE: CPT

## 2024-09-24 PROCEDURE — 85025 COMPLETE CBC W/AUTO DIFF WBC: CPT

## 2024-09-24 PROCEDURE — 81001 URINALYSIS AUTO W/SCOPE: CPT

## 2024-09-24 PROCEDURE — 93005 ELECTROCARDIOGRAM TRACING: CPT | Performed by: ORTHOPAEDIC SURGERY

## 2024-09-24 PROCEDURE — 87641 MR-STAPH DNA AMP PROBE: CPT

## 2024-09-24 PROCEDURE — 93010 ELECTROCARDIOGRAM REPORT: CPT | Performed by: NUCLEAR MEDICINE

## 2024-09-24 PROCEDURE — 80048 BASIC METABOLIC PNL TOTAL CA: CPT

## 2024-09-25 LAB — MRSA DNA SPEC QL NAA+PROBE: NEGATIVE

## 2024-10-15 ENCOUNTER — OFFICE VISIT (OUTPATIENT)
Dept: CARDIOLOGY CLINIC | Age: 74
End: 2024-10-15
Payer: MEDICARE

## 2024-10-15 VITALS
WEIGHT: 129 LBS | SYSTOLIC BLOOD PRESSURE: 132 MMHG | DIASTOLIC BLOOD PRESSURE: 83 MMHG | HEIGHT: 67 IN | HEART RATE: 88 BPM | BODY MASS INDEX: 20.25 KG/M2

## 2024-10-15 DIAGNOSIS — Z01.810 PREOP CARDIOVASCULAR EXAM: Primary | ICD-10-CM

## 2024-10-15 PROCEDURE — 3075F SYST BP GE 130 - 139MM HG: CPT | Performed by: INTERNAL MEDICINE

## 2024-10-15 PROCEDURE — 1036F TOBACCO NON-USER: CPT | Performed by: INTERNAL MEDICINE

## 2024-10-15 PROCEDURE — 1123F ACP DISCUSS/DSCN MKR DOCD: CPT | Performed by: INTERNAL MEDICINE

## 2024-10-15 PROCEDURE — G8427 DOCREV CUR MEDS BY ELIG CLIN: HCPCS | Performed by: INTERNAL MEDICINE

## 2024-10-15 PROCEDURE — G8484 FLU IMMUNIZE NO ADMIN: HCPCS | Performed by: INTERNAL MEDICINE

## 2024-10-15 PROCEDURE — 3079F DIAST BP 80-89 MM HG: CPT | Performed by: INTERNAL MEDICINE

## 2024-10-15 PROCEDURE — G8420 CALC BMI NORM PARAMETERS: HCPCS | Performed by: INTERNAL MEDICINE

## 2024-10-15 PROCEDURE — 1090F PRES/ABSN URINE INCON ASSESS: CPT | Performed by: INTERNAL MEDICINE

## 2024-10-15 PROCEDURE — 99204 OFFICE O/P NEW MOD 45 MIN: CPT | Performed by: INTERNAL MEDICINE

## 2024-10-15 PROCEDURE — G8399 PT W/DXA RESULTS DOCUMENT: HCPCS | Performed by: INTERNAL MEDICINE

## 2024-10-15 RX ORDER — ACETAMINOPHEN 500 MG
500 TABLET ORAL EVERY 6 HOURS PRN
COMMUNITY

## 2024-10-15 NOTE — PROGRESS NOTES
onset of  any chest pain, sob, palpitations, lightheadedness, dizziness, orthopnea, PND or pedal edema.   All medication side effects were discussed in details.    Thank youfor allowing me to participate in the care of this patient.   Please do not hesitate to contact me for any further questions.     Return if symptoms worsen or fail to improve, for Review testing, Regular follow up.       Electronically signed by Mohsen Pruett MD [facility]  10/15/2024 at 8:08 AM EDT

## 2024-10-27 DIAGNOSIS — M19.90 INFLAMMATORY ARTHRITIS: ICD-10-CM

## 2024-10-27 DIAGNOSIS — M06.00 SERONEGATIVE RHEUMATOID ARTHRITIS (HCC): ICD-10-CM

## 2024-10-28 RX ORDER — HYDROXYCHLOROQUINE SULFATE 200 MG/1
TABLET, FILM COATED ORAL
Qty: 45 TABLET | Refills: 3 | Status: SHIPPED | OUTPATIENT
Start: 2024-10-28

## 2024-11-05 ENCOUNTER — HOSPITAL ENCOUNTER (OUTPATIENT)
Dept: ULTRASOUND IMAGING | Age: 74
Discharge: HOME | End: 2024-11-05
Attending: ORTHOPAEDIC SURGERY
Payer: MEDICARE

## 2024-11-05 ENCOUNTER — HOSPITAL ENCOUNTER (OUTPATIENT)
Dept: GENERAL RADIOLOGY | Age: 74
Discharge: HOME | End: 2024-11-05
Payer: MEDICARE

## 2024-11-05 ENCOUNTER — HOSPITAL ENCOUNTER (OUTPATIENT)
Age: 74
Discharge: HOME | End: 2024-11-05
Payer: MEDICARE

## 2024-11-05 ENCOUNTER — HOSPITAL ENCOUNTER (EMERGENCY)
Age: 74
Discharge: HOME | End: 2024-11-05
Attending: EMERGENCY MEDICINE
Payer: MEDICARE

## 2024-11-05 VITALS
HEIGHT: 67 IN | RESPIRATION RATE: 16 BRPM | WEIGHT: 125 LBS | HEART RATE: 89 BPM | BODY MASS INDEX: 19.62 KG/M2 | SYSTOLIC BLOOD PRESSURE: 145 MMHG | OXYGEN SATURATION: 99 % | TEMPERATURE: 98.1 F | DIASTOLIC BLOOD PRESSURE: 67 MMHG

## 2024-11-05 DIAGNOSIS — Z96.652 PRESENCE OF LEFT ARTIFICIAL KNEE JOINT: ICD-10-CM

## 2024-11-05 DIAGNOSIS — G89.18 POSTOPERATIVE PAIN: Primary | ICD-10-CM

## 2024-11-05 DIAGNOSIS — Z96.652 AFTERCARE FOLLOWING LEFT KNEE JOINT REPLACEMENT SURGERY: ICD-10-CM

## 2024-11-05 DIAGNOSIS — Z47.1 AFTERCARE FOLLOWING LEFT KNEE JOINT REPLACEMENT SURGERY: ICD-10-CM

## 2024-11-05 DIAGNOSIS — Z47.1 AFTERCARE FOLLOWING JOINT REPLACEMENT: ICD-10-CM

## 2024-11-05 LAB
ALBUMIN SERPL BCP-MCNC: 3.2 GM/DL (ref 3.4–5)
ALP SERPL-CCNC: 107 U/L (ref 46–116)
ALT SERPL W P-5'-P-CCNC: 56 U/L (ref 14–63)
ANION GAP SERPL CALC-SCNC: 13 MEQ/L (ref 8–16)
AST SERPL W P-5'-P-CCNC: 38 U/L (ref 15–37)
BILIRUB SERPL-MCNC: 0.5 MG/DL (ref 0.2–1)
BUN SERPL-MCNC: 23 MG/DL (ref 7–18)
CALCIUM SERPL-MCNC: 9.7 MG/DL (ref 8.5–10.1)
CHLORIDE SERPL-SCNC: 104 MEQ/L (ref 98–107)
CO2 SERPL-SCNC: 25 MEQ/L (ref 21–32)
CREAT SERPL-MCNC: 1.6 MG/DL (ref 0.6–1.3)
GFR SERPL CREATININE-BSD FRML MDRD: 33 ML/MIN/1.73M2
GLUCOSE SERPL-MCNC: 128 MG/DL (ref 74–106)
MAGNESIUM SERPL-MCNC: 1.9 MG/DL (ref 1.8–2.4)
POTASSIUM SERPL-SCNC: 4 MEQ/L (ref 3.5–5.1)
PROT SERPL-MCNC: 6.6 GM/DL (ref 6.4–8.2)
SODIUM SERPL-SCNC: 142 MEQ/L (ref 136–145)

## 2024-11-05 PROCEDURE — 73562 X-RAY EXAM OF KNEE 3: CPT

## 2024-11-05 PROCEDURE — 93971 EXTREMITY STUDY: CPT

## 2024-11-05 PROCEDURE — 99284 EMERGENCY DEPT VISIT MOD MDM: CPT

## 2024-11-05 PROCEDURE — 96372 THER/PROPH/DIAG INJ SC/IM: CPT

## 2024-11-05 PROCEDURE — 6360000002 HC RX W HCPCS: Performed by: EMERGENCY MEDICINE

## 2024-11-05 PROCEDURE — 6370000000 HC RX 637 (ALT 250 FOR IP): Performed by: EMERGENCY MEDICINE

## 2024-11-05 PROCEDURE — 83735 ASSAY OF MAGNESIUM: CPT

## 2024-11-05 PROCEDURE — 80053 COMPREHEN METABOLIC PANEL: CPT

## 2024-11-05 RX ORDER — HYDROCODONE BITARTRATE AND ACETAMINOPHEN 5; 325 MG/1; MG/1
1 TABLET ORAL ONCE
Status: COMPLETED | OUTPATIENT
Start: 2024-11-05 | End: 2024-11-05

## 2024-11-05 RX ORDER — KETOROLAC TROMETHAMINE 30 MG/ML
30 INJECTION, SOLUTION INTRAMUSCULAR; INTRAVENOUS ONCE
Status: COMPLETED | OUTPATIENT
Start: 2024-11-05 | End: 2024-11-05

## 2024-11-05 RX ORDER — DIAZEPAM 5 MG/1
5 TABLET ORAL ONCE
Status: COMPLETED | OUTPATIENT
Start: 2024-11-05 | End: 2024-11-05

## 2024-11-05 RX ORDER — DIAZEPAM 5 MG/1
5 TABLET ORAL EVERY 12 HOURS PRN
Qty: 10 TABLET | Refills: 0 | Status: SHIPPED | OUTPATIENT
Start: 2024-11-05 | End: 2024-11-10

## 2024-11-05 RX ADMIN — KETOROLAC TROMETHAMINE 30 MG: 30 INJECTION, SOLUTION INTRAMUSCULAR at 19:18

## 2024-11-05 RX ADMIN — HYDROCODONE BITARTRATE AND ACETAMINOPHEN 1 TABLET: 5; 325 TABLET ORAL at 19:17

## 2024-11-05 RX ADMIN — DIAZEPAM 5 MG: 5 TABLET ORAL at 19:54

## 2024-11-05 ASSESSMENT — PAIN DESCRIPTION - DESCRIPTORS
DESCRIPTORS: CRAMPING
DESCRIPTORS: ACHING

## 2024-11-05 ASSESSMENT — PAIN DESCRIPTION - ORIENTATION
ORIENTATION: LEFT

## 2024-11-05 ASSESSMENT — PAIN SCALES - GENERAL
PAINLEVEL_OUTOF10: 5
PAINLEVEL_OUTOF10: 10
PAINLEVEL_OUTOF10: 10

## 2024-11-05 ASSESSMENT — PAIN - FUNCTIONAL ASSESSMENT
PAIN_FUNCTIONAL_ASSESSMENT: 0-10
PAIN_FUNCTIONAL_ASSESSMENT: 0-10

## 2024-11-05 ASSESSMENT — PAIN DESCRIPTION - PAIN TYPE
TYPE: ACUTE PAIN;SURGICAL PAIN
TYPE: ACUTE PAIN

## 2024-11-05 ASSESSMENT — PAIN DESCRIPTION - LOCATION
LOCATION: KNEE

## 2024-11-06 NOTE — DISCHARGE INSTRUCTIONS
Do not take the small muscle relaxant pill that you were recently prescribed.  Instead take Valium as needed twice a day for pain or spasm.  This can make you tired or groggy.  Take Norco for severe pain.  Do not drive or operate machinery while taking Norco.  You may take another Norco tonight around midnight.  You may take the next dose of Valium in the morning.  Call your primary care doctor and orthopedic surgeon to discuss further plan and follow-up if you are still having pain and spasms.  Increase fluids at home

## 2024-11-06 NOTE — ED PROVIDER NOTES
[facility]  [address] [place] [address]  Phone: [phone]  EMERGENCY DEPARTMENT ENCOUNTER      Pt Name: Vicki Carrasquillo  MRN: [id number]  Birthdate 1950  Date of evaluation: 11/5/2024  Provider: Tye Linder MD    CHIEF COMPLAINT       Chief Complaint   Patient presents with    Knee Pain    Spasms         HISTORY OF PRESENT ILLNESS      Vicki Carrasquillo is a 74 y.o. female who presents to the emergency department with above-noted complaint.  Patient is been doing okay.  She is status post left knee surgery.  She had evaluation and x-rays and ultrasound of her leg today.  This was negative.  She subsequently has more pain and spasm she describes by her knee going down to her left calf.  Denies weakness in her toes or numbness.        REVIEW OF SYSTEMS     Positive for leg pain.  No fall trauma no weakness  Review of Systems  All systems negative except as marked.     PAST MEDICAL HISTORY     Past Medical History:   Diagnosis Date    HTN (hypertension)     Hyperlipidemia     Hypertension          SURGICAL HISTORY       Past Surgical History:   Procedure Laterality Date    BACK SURGERY  2004    BRAIN SURGERY  02/04/2021    meningioma         CURRENT MEDICATIONS       Previous Medications    ACETAMINOPHEN (TYLENOL) 500 MG TABLET    Take 1 tablet by mouth every 6 hours as needed for Pain    ATORVASTATIN (LIPITOR) 20 MG TABLET    Take 1 tablet by mouth daily    CALCIUM CITRATE    by Does not apply route 2 times daily     HYDROXYCHLOROQUINE (PLAQUENIL) 200 MG TABLET    TAKE 1 & 1/2 (ONE & ONE-HALF) TABLETS BY MOUTH ONCE DAILY    LISINOPRIL (PRINIVIL;ZESTRIL) 10 MG TABLET    take 1 tablet by mouth twice a day    METOPROLOL TARTRATE (LOPRESSOR) 25 MG TABLET    Take 0.5 tablets by mouth 2 times daily    MULTIPLE VITAMIN (MULTI-VITAMIN PO)    Take  by mouth daily.      MULTIPLE VITAMINS-MINERALS (VISION FORMULA PO)    Take  by mouth daily.         ALLERGIES       Patient has no known  REFERRED TO:  Asif Pinedo MD  [address] [place] [address] [phone]    Call   Follow up from ER condition      DISCHARGE MEDICATIONS:  New Prescriptions    DIAZEPAM (VALIUM) 5 MG TABLET    Take 1 tablet by mouth every 12 hours as needed (Spasm) for up to 10 doses. Max Daily Amount: 10 mg       (Please note that portions of this note were completed with a voice recognition program.  Efforts were made to edit the dictations but occasionally words are mis-transcribed.)    Tye Linder MD (electronically signed)  Attending Emergency Physician                       Tye Linder MD  11/05/24 1957

## 2024-11-06 NOTE — ED TRIAGE NOTES
Pt comes into ER room 1 via wheelchair as she was assisted out of her car. She has always suffered from RLS, but she had a total lefty knee surgery 2 weeks ago. And the combination of the two are just unbearable, she is on muscle relaxer's and taking tylenol 3 times a day and her pain tonight is 10/10 with the RLS muscle cramps / spasms.

## 2024-11-19 ENCOUNTER — HOSPITAL ENCOUNTER (OUTPATIENT)
Age: 74
Setting detail: SPECIMEN
Discharge: HOME | End: 2024-11-19
Payer: MEDICARE

## 2024-11-19 PROCEDURE — 87147 CULTURE TYPE IMMUNOLOGIC: CPT

## 2024-11-19 PROCEDURE — 87205 SMEAR GRAM STAIN: CPT

## 2024-11-19 PROCEDURE — 87070 CULTURE OTHR SPECIMN AEROBIC: CPT

## 2024-11-21 LAB
BACTERIA SPEC AEROBE CULT: NORMAL
GRAM STN SPEC: NORMAL

## 2024-12-10 ENCOUNTER — HOSPITAL ENCOUNTER (OUTPATIENT)
Age: 74
Discharge: HOME | End: 2024-12-10
Payer: MEDICARE

## 2024-12-10 ENCOUNTER — HOSPITAL ENCOUNTER (OUTPATIENT)
Dept: GENERAL RADIOLOGY | Age: 74
Discharge: HOME | End: 2024-12-10
Payer: MEDICARE

## 2024-12-10 DIAGNOSIS — Z96.652 PRESENCE OF LEFT ARTIFICIAL KNEE JOINT: ICD-10-CM

## 2024-12-10 DIAGNOSIS — M17.0 BILATERAL PRIMARY OSTEOARTHRITIS OF KNEE: ICD-10-CM

## 2024-12-10 PROCEDURE — 73562 X-RAY EXAM OF KNEE 3: CPT

## 2024-12-10 PROCEDURE — 73564 X-RAY EXAM KNEE 4 OR MORE: CPT

## 2025-01-02 ENCOUNTER — OFFICE VISIT (OUTPATIENT)
Age: 75
End: 2025-01-02
Payer: MEDICARE

## 2025-01-02 ENCOUNTER — HOSPITAL ENCOUNTER (OUTPATIENT)
Age: 75
Discharge: HOME | End: 2025-01-02
Payer: MEDICARE

## 2025-01-02 VITALS
HEIGHT: 67 IN | OXYGEN SATURATION: 98 % | WEIGHT: 126 LBS | DIASTOLIC BLOOD PRESSURE: 82 MMHG | HEART RATE: 74 BPM | BODY MASS INDEX: 19.78 KG/M2 | SYSTOLIC BLOOD PRESSURE: 128 MMHG

## 2025-01-02 DIAGNOSIS — Z51.81 MEDICATION MONITORING ENCOUNTER: ICD-10-CM

## 2025-01-02 DIAGNOSIS — M85.80 OSTEOPENIA, UNSPECIFIED LOCATION: ICD-10-CM

## 2025-01-02 DIAGNOSIS — R79.89 TSH ELEVATION: ICD-10-CM

## 2025-01-02 DIAGNOSIS — M06.00 SERONEGATIVE RHEUMATOID ARTHRITIS (HCC): Primary | ICD-10-CM

## 2025-01-02 DIAGNOSIS — M06.00 SERONEGATIVE RHEUMATOID ARTHRITIS (HCC): ICD-10-CM

## 2025-01-02 DIAGNOSIS — M15.0 PRIMARY OSTEOARTHRITIS INVOLVING MULTIPLE JOINTS: ICD-10-CM

## 2025-01-02 LAB
ALBUMIN SERPL BCG-MCNC: 4 G/DL (ref 3.5–5.1)
ALP SERPL-CCNC: 68 U/L (ref 38–126)
ALT SERPL W/O P-5'-P-CCNC: 20 U/L (ref 11–66)
ANION GAP SERPL CALC-SCNC: 9 MEQ/L (ref 8–16)
AST SERPL-CCNC: 26 U/L (ref 5–40)
BASOPHILS ABSOLUTE: 0.1 THOU/MM3 (ref 0–0.1)
BASOPHILS NFR BLD AUTO: 0.8 %
BILIRUB SERPL-MCNC: 0.2 MG/DL (ref 0.3–1.2)
BUN SERPL-MCNC: 19 MG/DL (ref 7–22)
CALCIUM SERPL-MCNC: 9.2 MG/DL (ref 8.5–10.5)
CHLORIDE SERPL-SCNC: 105 MEQ/L (ref 98–111)
CO2 SERPL-SCNC: 26 MEQ/L (ref 23–33)
CREAT SERPL-MCNC: 1.2 MG/DL (ref 0.4–1.2)
DEPRECATED RDW RBC AUTO: 53.1 FL (ref 35–45)
EOSINOPHIL NFR BLD AUTO: 1.8 %
EOSINOPHILS ABSOLUTE: 0.1 THOU/MM3 (ref 0–0.4)
ERYTHROCYTE [DISTWIDTH] IN BLOOD BY AUTOMATED COUNT: 14.6 % (ref 11.5–14.5)
ERYTHROCYTE [SEDIMENTATION RATE] IN BLOOD BY WESTERGREN METHOD: 6 MM/HR (ref 0–20)
GFR SERPL CREATININE-BSD FRML MDRD: 47 ML/MIN/1.73M2
GLUCOSE SERPL-MCNC: 96 MG/DL (ref 70–108)
HCT VFR BLD AUTO: 38.8 % (ref 37–47)
HGB BLD-MCNC: 11.8 GM/DL (ref 12–16)
IMM GRANULOCYTES # BLD AUTO: 0.01 THOU/MM3 (ref 0–0.07)
IMM GRANULOCYTES NFR BLD AUTO: 0.2 %
LYMPHOCYTES ABSOLUTE: 1.7 THOU/MM3 (ref 1–4.8)
LYMPHOCYTES NFR BLD AUTO: 26.1 %
MCH RBC QN AUTO: 29.6 PG (ref 26–33)
MCHC RBC AUTO-ENTMCNC: 30.4 GM/DL (ref 32.2–35.5)
MCV RBC AUTO: 97.5 FL (ref 81–99)
MONOCYTES ABSOLUTE: 0.5 THOU/MM3 (ref 0.4–1.3)
MONOCYTES NFR BLD AUTO: 7 %
NEUTROPHILS ABSOLUTE: 4.2 THOU/MM3 (ref 1.8–7.7)
NEUTROPHILS NFR BLD AUTO: 64.1 %
NRBC BLD AUTO-RTO: 0 /100 WBC
PLATELET # BLD AUTO: 209 THOU/MM3 (ref 130–400)
PMV BLD AUTO: 12 FL (ref 9.4–12.4)
POTASSIUM SERPL-SCNC: 3.9 MEQ/L (ref 3.5–5.2)
PROT SERPL-MCNC: 6.1 G/DL (ref 6.1–8)
RBC # BLD AUTO: 3.98 MILL/MM3 (ref 4.2–5.4)
SODIUM SERPL-SCNC: 140 MEQ/L (ref 135–145)
TSH SERPL DL<=0.005 MIU/L-ACNC: 1.81 UIU/ML (ref 0.4–4.2)
WBC # BLD AUTO: 6.6 THOU/MM3 (ref 4.8–10.8)

## 2025-01-02 PROCEDURE — 85025 COMPLETE CBC W/AUTO DIFF WBC: CPT

## 2025-01-02 PROCEDURE — 84443 ASSAY THYROID STIM HORMONE: CPT

## 2025-01-02 PROCEDURE — 80053 COMPREHEN METABOLIC PANEL: CPT

## 2025-01-02 PROCEDURE — 85651 RBC SED RATE NONAUTOMATED: CPT

## 2025-01-02 PROCEDURE — 36415 COLL VENOUS BLD VENIPUNCTURE: CPT

## 2025-01-02 RX ORDER — AMLODIPINE BESYLATE 5 MG/1
5 TABLET ORAL NIGHTLY
COMMUNITY

## 2025-01-02 ASSESSMENT — ENCOUNTER SYMPTOMS
EYES NEGATIVE: 1
GASTROINTESTINAL NEGATIVE: 1
RESPIRATORY NEGATIVE: 1

## 2025-01-02 NOTE — PROGRESS NOTES
[facility] RHEUMATOLOGY FOLLOW UP NOTE     Date Of Service: 1/2/2025  Provider: CARLTON DE LEON DO ,   PCP: Aide Rodriguez APRN - CNP   Name: Vicki Carrasquillo   MRN: [id number]    Assessment   Plan     1. Inflammatory arthritis. -- undiff.                - hands, Left posterior shoulder, knees x 1 year, back pain x several year. Denies joint redness/warmth + swelling along the CMC joint. XR hands with subluxation of 2nd MCP noted. Joint exam with tender MCPs, PIPs, DIPs and CMC joint bilateral elbows. , + heberden nodes bilat.                 - plaquenil 300mg daily ( June 2019)          2. Primary osteoarthritis involving multiple joints  - evaluation of secondary causes. --- crystalline, inflammatory , vs other. ? Inflammatory osteoarthritis    - prior tx: volteran gel (no relief), avoiding NSAIDs b/c CKD               - continue tylenol                                3. Trigger finger. - left thumb, right ring finger - active - mild and intermittent    - declined intervention at this time      4. Chronic low back pain --mild and intermittent - improved after knee replacement surgery    hx of back pain prior surgery in 2004, intemrittent low back pain mainly with prolonged standing. No radicular sx's, paresthesias or red flag sx's               - avoiding oral/systemic NSAIDs b/c of CKD               - continue tylenol     5. Medication monitoring  6. CKD stage 3-   - Plaquenil eye examination  -- leeann robles --- request eye exam results from Dr. Meyer  - repeat labs given the worsening renal functions on 11/5/2024 evaluation.     7. osteoporosis - re-evaluation of DEXA ordered.    - last DEXA 2020    - if renal function with GFR < 35 will need to hold on restart of alendronate and discuss other treatment options such as prolia 60mg q 6 months.     8. TSH elevation - mild elevation on prior labs per review- repeat TSH ordered.               No follow-ups on file.  New Prescriptions    No medications on

## 2025-01-03 RX ORDER — ALENDRONATE SODIUM 70 MG/1
70 TABLET ORAL
Qty: 13 TABLET | Refills: 0 | Status: SHIPPED | OUTPATIENT
Start: 2025-01-03

## 2025-01-03 NOTE — RESULT ENCOUNTER NOTE
The repeat labs show a mild anemia (low hemoglobin), improvement of the kidney function test and a normal thoyroid test. Please restart the alendronate 70mg once weekly and have your labs repeated in 4 weeks

## 2025-01-06 ENCOUNTER — TELEPHONE (OUTPATIENT)
Age: 75
End: 2025-01-06

## 2025-01-06 NOTE — TELEPHONE ENCOUNTER
Pt states that she had a bone density 06/21/2024 in [place], insurance only covered every 2 years, just starting alendronate - should she have done this year or okay to wait?

## 2025-01-21 ENCOUNTER — HOSPITAL ENCOUNTER (OUTPATIENT)
Dept: GENERAL RADIOLOGY | Age: 75
Discharge: HOME | End: 2025-01-21
Attending: ORTHOPAEDIC SURGERY
Payer: MEDICARE

## 2025-01-21 ENCOUNTER — HOSPITAL ENCOUNTER (OUTPATIENT)
Age: 75
Discharge: HOME | End: 2025-01-21
Payer: MEDICARE

## 2025-01-21 DIAGNOSIS — Z96.652 PRESENCE OF LEFT ARTIFICIAL KNEE JOINT: ICD-10-CM

## 2025-01-21 PROCEDURE — 73562 X-RAY EXAM OF KNEE 3: CPT

## 2025-02-04 ENCOUNTER — HOSPITAL ENCOUNTER (OUTPATIENT)
Age: 75
Discharge: HOME | End: 2025-02-04
Payer: MEDICARE

## 2025-02-04 DIAGNOSIS — M85.80 OSTEOPENIA, UNSPECIFIED LOCATION: ICD-10-CM

## 2025-02-04 LAB
ANION GAP SERPL CALC-SCNC: 12 MEQ/L (ref 8–16)
BUN SERPL-MCNC: 14 MG/DL (ref 7–22)
CALCIUM SERPL-MCNC: 9.1 MG/DL (ref 8.5–10.5)
CHLORIDE SERPL-SCNC: 107 MEQ/L (ref 98–111)
CO2 SERPL-SCNC: 26 MEQ/L (ref 23–33)
CREAT SERPL-MCNC: 1.1 MG/DL (ref 0.4–1.2)
GFR SERPL CREATININE-BSD FRML MDRD: 52 ML/MIN/1.73M2
GLUCOSE SERPL-MCNC: 91 MG/DL (ref 70–108)
POTASSIUM SERPL-SCNC: 4 MEQ/L (ref 3.5–5.2)
SODIUM SERPL-SCNC: 145 MEQ/L (ref 135–145)

## 2025-02-04 PROCEDURE — 80048 BASIC METABOLIC PNL TOTAL CA: CPT

## 2025-02-04 PROCEDURE — 36415 COLL VENOUS BLD VENIPUNCTURE: CPT

## 2025-04-24 DIAGNOSIS — M06.00 SERONEGATIVE RHEUMATOID ARTHRITIS (HCC): ICD-10-CM

## 2025-04-24 DIAGNOSIS — M19.90 INFLAMMATORY ARTHRITIS: ICD-10-CM

## 2025-04-24 RX ORDER — HYDROXYCHLOROQUINE SULFATE 200 MG/1
300 TABLET, FILM COATED ORAL DAILY
Qty: 45 TABLET | Refills: 3 | Status: SHIPPED | OUTPATIENT
Start: 2025-04-24

## 2025-04-24 NOTE — TELEPHONE ENCOUNTER
Vicki Carrasquillo called requesting a refill on the following medications:  Requested Prescriptions     Pending Prescriptions Disp Refills    hydroxychloroquine (PLAQUENIL) 200 MG tablet 45 tablet 3     Pharmacy verified: [facility] in [place]  .      Date of last visit: 1/2/2025  Date of next visit (if applicable): 1/5/2026

## 2025-06-12 ENCOUNTER — TRANSCRIBE ORDERS (OUTPATIENT)
Dept: ADMINISTRATIVE | Age: 75
End: 2025-06-12

## 2025-06-12 DIAGNOSIS — M41.26 OTHER IDIOPATHIC SCOLIOSIS, LUMBAR REGION: Primary | ICD-10-CM

## 2025-07-07 ENCOUNTER — HOSPITAL ENCOUNTER (OUTPATIENT)
Dept: MRI IMAGING | Age: 75
Discharge: HOME | End: 2025-07-07
Attending: ORTHOPAEDIC SURGERY
Payer: MEDICARE

## 2025-07-07 DIAGNOSIS — M41.26 OTHER IDIOPATHIC SCOLIOSIS, LUMBAR REGION: ICD-10-CM

## 2025-07-07 PROCEDURE — 72148 MRI LUMBAR SPINE W/O DYE: CPT

## 2025-08-19 ENCOUNTER — HOSPITAL ENCOUNTER (OUTPATIENT)
Dept: GENERAL RADIOLOGY | Age: 75
Discharge: HOME | End: 2025-08-19
Payer: MEDICARE

## 2025-08-19 ENCOUNTER — HOSPITAL ENCOUNTER (OUTPATIENT)
Age: 75
Discharge: HOME | End: 2025-08-19
Payer: MEDICARE

## 2025-08-19 DIAGNOSIS — M48.062 LUMBAR STENOSIS WITH NEUROGENIC CLAUDICATION: ICD-10-CM

## 2025-08-19 LAB
ANION GAP SERPL CALC-SCNC: 15 MEQ/L (ref 8–16)
APTT: 29.7 SECONDS (ref 22–38)
BUN SERPL-MCNC: 20 MG/DL (ref 8–23)
CALCIUM SERPL-MCNC: 9.3 MG/DL (ref 8.5–10.5)
CHLORIDE SERPL-SCNC: 107 MEQ/L (ref 98–111)
CO2 SERPL-SCNC: 24 MEQ/L (ref 22–29)
CREAT SERPL-MCNC: 1.3 MG/DL (ref 0.5–0.9)
DEPRECATED RDW RBC AUTO: 48.9 FL (ref 35–45)
EKG ATRIAL RATE: 74 BPM
EKG P AXIS: 74 DEGREES
EKG P-R INTERVAL: 166 MS
EKG Q-T INTERVAL: 414 MS
EKG QRS DURATION: 92 MS
EKG QTC CALCULATION (BAZETT): 459 MS
EKG R AXIS: 66 DEGREES
EKG T AXIS: 50 DEGREES
EKG VENTRICULAR RATE: 74 BPM
ERYTHROCYTE [DISTWIDTH] IN BLOOD BY AUTOMATED COUNT: 13.7 % (ref 11.5–14.5)
GFR SERPL CREATININE-BSD FRML MDRD: 42 ML/MIN/1.73M2
GLUCOSE SERPL-MCNC: 91 MG/DL (ref 74–109)
HCT VFR BLD AUTO: 42.7 % (ref 37–47)
HGB BLD-MCNC: 13.4 GM/DL (ref 12–16)
INR BLD: 0.98 (ref 0.85–1.13)
MCH RBC QN AUTO: 30.3 PG (ref 26–33)
MCHC RBC AUTO-ENTMCNC: 31.4 GM/DL (ref 32.2–35.5)
MCV RBC AUTO: 96.6 FL (ref 81–99)
PLATELET # BLD AUTO: 190 THOU/MM3 (ref 130–400)
PMV BLD AUTO: 11.8 FL (ref 9.4–12.4)
POTASSIUM SERPL-SCNC: 3.9 MEQ/L (ref 3.5–5.2)
RBC # BLD AUTO: 4.42 MILL/MM3 (ref 4.2–5.4)
SODIUM SERPL-SCNC: 146 MEQ/L (ref 135–145)
WBC # BLD AUTO: 5.9 THOU/MM3 (ref 4.8–10.8)

## 2025-08-19 PROCEDURE — 85610 PROTHROMBIN TIME: CPT

## 2025-08-19 PROCEDURE — 85027 COMPLETE CBC AUTOMATED: CPT

## 2025-08-19 PROCEDURE — 85730 THROMBOPLASTIN TIME PARTIAL: CPT

## 2025-08-19 PROCEDURE — 80048 BASIC METABOLIC PNL TOTAL CA: CPT

## 2025-08-19 PROCEDURE — 36415 COLL VENOUS BLD VENIPUNCTURE: CPT

## 2025-08-19 PROCEDURE — 71046 X-RAY EXAM CHEST 2 VIEWS: CPT

## 2025-08-19 PROCEDURE — 87641 MR-STAPH DNA AMP PROBE: CPT

## 2025-08-19 PROCEDURE — 93005 ELECTROCARDIOGRAM TRACING: CPT | Performed by: PHYSICIAN ASSISTANT

## 2025-08-20 LAB — MECA+MECC+MREJ ISLT/SPM QL: NEGATIVE

## 2025-08-30 DIAGNOSIS — M19.90 INFLAMMATORY ARTHRITIS: ICD-10-CM

## 2025-08-30 DIAGNOSIS — M06.00 SERONEGATIVE RHEUMATOID ARTHRITIS (HCC): ICD-10-CM

## 2025-09-02 RX ORDER — HYDROXYCHLOROQUINE SULFATE 200 MG/1
TABLET, FILM COATED ORAL
Qty: 45 TABLET | Refills: 3 | Status: SHIPPED | OUTPATIENT
Start: 2025-09-02